# Patient Record
Sex: MALE | Race: WHITE | URBAN - METROPOLITAN AREA
[De-identification: names, ages, dates, MRNs, and addresses within clinical notes are randomized per-mention and may not be internally consistent; named-entity substitution may affect disease eponyms.]

---

## 2017-02-12 ENCOUNTER — EMERGENCY (EMERGENCY)
Facility: HOSPITAL | Age: 62
LOS: 0 days | Discharge: HOME | End: 2017-02-12

## 2017-06-27 DIAGNOSIS — Z87.891 PERSONAL HISTORY OF NICOTINE DEPENDENCE: ICD-10-CM

## 2017-06-27 DIAGNOSIS — R06.2 WHEEZING: ICD-10-CM

## 2017-06-27 DIAGNOSIS — I48.91 UNSPECIFIED ATRIAL FIBRILLATION: ICD-10-CM

## 2017-06-27 DIAGNOSIS — R42 DIZZINESS AND GIDDINESS: ICD-10-CM

## 2019-04-20 ENCOUNTER — EMERGENCY (EMERGENCY)
Facility: HOSPITAL | Age: 64
LOS: 0 days | Discharge: HOME | End: 2019-04-20
Attending: EMERGENCY MEDICINE | Admitting: EMERGENCY MEDICINE
Payer: MEDICAID

## 2019-04-20 VITALS
WEIGHT: 250 LBS | HEART RATE: 63 BPM | TEMPERATURE: 98 F | DIASTOLIC BLOOD PRESSURE: 87 MMHG | RESPIRATION RATE: 16 BRPM | OXYGEN SATURATION: 97 % | HEIGHT: 74 IN | SYSTOLIC BLOOD PRESSURE: 139 MMHG

## 2019-04-20 VITALS
TEMPERATURE: 98 F | SYSTOLIC BLOOD PRESSURE: 140 MMHG | HEART RATE: 94 BPM | OXYGEN SATURATION: 98 % | RESPIRATION RATE: 18 BRPM | DIASTOLIC BLOOD PRESSURE: 88 MMHG

## 2019-04-20 DIAGNOSIS — F17.210 NICOTINE DEPENDENCE, CIGARETTES, UNCOMPLICATED: ICD-10-CM

## 2019-04-20 DIAGNOSIS — Z79.899 OTHER LONG TERM (CURRENT) DRUG THERAPY: ICD-10-CM

## 2019-04-20 DIAGNOSIS — G40.909 EPILEPSY, UNSPECIFIED, NOT INTRACTABLE, WITHOUT STATUS EPILEPTICUS: ICD-10-CM

## 2019-04-20 DIAGNOSIS — Z79.01 LONG TERM (CURRENT) USE OF ANTICOAGULANTS: ICD-10-CM

## 2019-04-20 DIAGNOSIS — S42.009A FRACTURE OF UNSPECIFIED PART OF UNSPECIFIED CLAVICLE, INITIAL ENCOUNTER FOR CLOSED FRACTURE: Chronic | ICD-10-CM

## 2019-04-20 DIAGNOSIS — I48.91 UNSPECIFIED ATRIAL FIBRILLATION: ICD-10-CM

## 2019-04-20 DIAGNOSIS — Z98.890 OTHER SPECIFIED POSTPROCEDURAL STATES: ICD-10-CM

## 2019-04-20 LAB
ALBUMIN SERPL ELPH-MCNC: 4 G/DL — SIGNIFICANT CHANGE UP (ref 3.5–5.2)
ALP SERPL-CCNC: 102 U/L — SIGNIFICANT CHANGE UP (ref 30–115)
ALT FLD-CCNC: 185 U/L — HIGH (ref 0–41)
ANION GAP SERPL CALC-SCNC: 9 MMOL/L — SIGNIFICANT CHANGE UP (ref 7–14)
APPEARANCE UR: CLEAR — SIGNIFICANT CHANGE UP
AST SERPL-CCNC: 186 U/L — HIGH (ref 0–41)
BILIRUB SERPL-MCNC: 0.9 MG/DL — SIGNIFICANT CHANGE UP (ref 0.2–1.2)
BILIRUB UR-MCNC: NEGATIVE — SIGNIFICANT CHANGE UP
BUN SERPL-MCNC: 8 MG/DL — LOW (ref 10–20)
CALCIUM SERPL-MCNC: 9.6 MG/DL — SIGNIFICANT CHANGE UP (ref 8.5–10.1)
CHLORIDE SERPL-SCNC: 103 MMOL/L — SIGNIFICANT CHANGE UP (ref 98–110)
CO2 SERPL-SCNC: 26 MMOL/L — SIGNIFICANT CHANGE UP (ref 17–32)
COLOR SPEC: YELLOW — SIGNIFICANT CHANGE UP
CREAT SERPL-MCNC: 0.8 MG/DL — SIGNIFICANT CHANGE UP (ref 0.7–1.5)
DIFF PNL FLD: NEGATIVE — SIGNIFICANT CHANGE UP
GAS PNL BLDV: SIGNIFICANT CHANGE UP
GLUCOSE SERPL-MCNC: 101 MG/DL — HIGH (ref 70–99)
GLUCOSE UR QL: NEGATIVE MG/DL — SIGNIFICANT CHANGE UP
HCT VFR BLD CALC: 42.8 % — SIGNIFICANT CHANGE UP (ref 42–52)
HGB BLD-MCNC: 14.8 G/DL — SIGNIFICANT CHANGE UP (ref 14–18)
KETONES UR-MCNC: NEGATIVE — SIGNIFICANT CHANGE UP
LEUKOCYTE ESTERASE UR-ACNC: NEGATIVE — SIGNIFICANT CHANGE UP
MCHC RBC-ENTMCNC: 33.1 PG — HIGH (ref 27–31)
MCHC RBC-ENTMCNC: 34.6 G/DL — SIGNIFICANT CHANGE UP (ref 32–37)
MCV RBC AUTO: 95.7 FL — HIGH (ref 80–94)
NITRITE UR-MCNC: NEGATIVE — SIGNIFICANT CHANGE UP
NRBC # BLD: 0 /100 WBCS — SIGNIFICANT CHANGE UP (ref 0–0)
NT-PROBNP SERPL-SCNC: 137 PG/ML — SIGNIFICANT CHANGE UP (ref 0–300)
PH UR: 6.5 — SIGNIFICANT CHANGE UP (ref 5–8)
PLATELET # BLD AUTO: 134 K/UL — SIGNIFICANT CHANGE UP (ref 130–400)
POTASSIUM SERPL-MCNC: 4.3 MMOL/L — SIGNIFICANT CHANGE UP (ref 3.5–5)
POTASSIUM SERPL-SCNC: 4.3 MMOL/L — SIGNIFICANT CHANGE UP (ref 3.5–5)
PROT SERPL-MCNC: 7.6 G/DL — SIGNIFICANT CHANGE UP (ref 6–8)
PROT UR-MCNC: 100 MG/DL
RBC # BLD: 4.47 M/UL — LOW (ref 4.7–6.1)
RBC # FLD: 13.1 % — SIGNIFICANT CHANGE UP (ref 11.5–14.5)
SODIUM SERPL-SCNC: 138 MMOL/L — SIGNIFICANT CHANGE UP (ref 135–146)
SP GR SPEC: 1.02 — SIGNIFICANT CHANGE UP (ref 1.01–1.03)
TROPONIN T SERPL-MCNC: <0.01 NG/ML — SIGNIFICANT CHANGE UP
UROBILINOGEN FLD QL: 0.2 MG/DL — SIGNIFICANT CHANGE UP (ref 0.2–0.2)
WBC # BLD: 5.86 K/UL — SIGNIFICANT CHANGE UP (ref 4.8–10.8)
WBC # FLD AUTO: 5.86 K/UL — SIGNIFICANT CHANGE UP (ref 4.8–10.8)

## 2019-04-20 PROCEDURE — 70450 CT HEAD/BRAIN W/O DYE: CPT | Mod: 26

## 2019-04-20 PROCEDURE — 71046 X-RAY EXAM CHEST 2 VIEWS: CPT | Mod: 26

## 2019-04-20 PROCEDURE — 99285 EMERGENCY DEPT VISIT HI MDM: CPT

## 2019-04-20 RX ORDER — ASPIRIN/CALCIUM CARB/MAGNESIUM 324 MG
81 TABLET ORAL ONCE
Qty: 0 | Refills: 0 | Status: COMPLETED | OUTPATIENT
Start: 2019-04-20 | End: 2019-04-20

## 2019-04-20 RX ORDER — SODIUM CHLORIDE 9 MG/ML
1000 INJECTION, SOLUTION INTRAVENOUS ONCE
Qty: 0 | Refills: 0 | Status: COMPLETED | OUTPATIENT
Start: 2019-04-20 | End: 2019-04-20

## 2019-04-20 RX ADMIN — Medication 81 MILLIGRAM(S): at 12:28

## 2019-04-20 RX ADMIN — SODIUM CHLORIDE 1000 MILLILITER(S): 9 INJECTION, SOLUTION INTRAVENOUS at 12:29

## 2019-04-20 NOTE — ED ADULT NURSE NOTE - NSIMPLEMENTINTERV_GEN_ALL_ED
Implemented All Universal Safety Interventions:  [place] to call system. Call bell, personal items and telephone within reach. Instruct patient to call for assistance. Room bathroom lighting operational. Non-slip footwear when patient is off stretcher. Physically safe environment: no spills, clutter or unnecessary equipment. Stretcher in lowest position, wheels locked, appropriate side rails in place.

## 2019-04-20 NOTE — ED PROVIDER NOTE - PROGRESS NOTE DETAILS
pt will to have a outpatient neurology workup. pt informed of the importance of follow up with the neurologist and his cardiologist.  Patient to be discharged from ED. Any available test results were discussed with patient and/or family. Verbal instructions given, including instructions to return to ED immediately for any new, worsening, or concerning symptoms. Patient endorsed understanding. Written discharge instructions additionally given, including follow-up plan.

## 2019-04-20 NOTE — ED PROVIDER NOTE - CARE PROVIDER_API CALL
Elbert Ceron)  Neurology; Neuromuscular Medicine  [address] [place] [address], [address]  [place], [place] [id number]  Phone: [phone]  Fax: [phone]  Follow Up Time:

## 2019-04-20 NOTE — ED PROVIDER NOTE - OBJECTIVE STATEMENT
64 yo male with a pmh of afib on Anitha presents after a seizure. he was putting up his storm door when he became dizzy and called on his wife who was able to assist him to the floor while he began seizing. wife witnessed tonic clonic movement along with foaming from the mouth and lost control of him bowels. seizure lasted about 3 minutes and postictal period occurred. he did not hit his head or obtain any trauma from the fall. he had experience seizures in the past with the most recent being 3 months ago but he had not seen a neurologist in the past. he denies any recent illnesses or travel, fevers, chill, abdominal pain, chest pain or difficulty breathing. he smokes about 10 cigarettes a day and drinks about 2 to 3 beers daily. he was seen by his Cardiologist in March when an echo was performed and no changes or abnormalities were reported from his understanding.     PCP- Maryan  Cardio- Paulie

## 2019-04-20 NOTE — ED PROVIDER NOTE - ATTENDING CONTRIBUTION TO CARE
Hx of afib on Eliquis. Hx of seizures since childhood. Many years ago was on dilantin. Not on meds for over 2 years. Rarely has seizures. Had seizure this am. Pt states he does not want to take med. Today was incontinent of stool. Witnessed seizure. On exam S1S2 irreg irreg. lung clear, neuro intact. No signs of injury.

## 2019-04-20 NOTE — ED PROVIDER NOTE - CLINICAL SUMMARY MEDICAL DECISION MAKING FREE TEXT BOX
Pt advised he needs evaluation with neuro for EEG. Would benefit from meds. Pt does not want to consider at this time.

## 2019-04-20 NOTE — ED ADULT TRIAGE NOTE - CHIEF COMPLAINT QUOTE
Pt brought in by ambulance. "I had a seizure." Pt has history of seizures but is not on medication. "In the last year I have had three seizures."

## 2019-04-22 PROBLEM — I48.91 UNSPECIFIED ATRIAL FIBRILLATION: Chronic | Status: ACTIVE | Noted: 2019-04-20

## 2019-04-22 PROBLEM — Z00.00 ENCOUNTER FOR PREVENTIVE HEALTH EXAMINATION: Status: ACTIVE | Noted: 2019-04-22

## 2019-04-22 PROBLEM — R56.9 UNSPECIFIED CONVULSIONS: Chronic | Status: ACTIVE | Noted: 2019-04-20

## 2019-04-30 ENCOUNTER — APPOINTMENT (OUTPATIENT)
Dept: NEUROLOGY | Facility: CLINIC | Age: 64
End: 2019-04-30

## 2019-05-06 ENCOUNTER — OUTPATIENT (OUTPATIENT)
Dept: OUTPATIENT SERVICES | Facility: HOSPITAL | Age: 64
LOS: 1 days | Discharge: HOME | End: 2019-05-06

## 2019-05-06 ENCOUNTER — APPOINTMENT (OUTPATIENT)
Dept: INTERNAL MEDICINE | Facility: CLINIC | Age: 64
End: 2019-05-06

## 2019-05-06 VITALS
HEART RATE: 116 BPM | SYSTOLIC BLOOD PRESSURE: 178 MMHG | WEIGHT: 248 LBS | TEMPERATURE: 97 F | DIASTOLIC BLOOD PRESSURE: 141 MMHG

## 2019-05-06 VITALS — HEIGHT: 74 IN | BODY MASS INDEX: 31.84 KG/M2

## 2019-05-06 DIAGNOSIS — F17.200 NICOTINE DEPENDENCE, UNSPECIFIED, UNCOMPLICATED: ICD-10-CM

## 2019-05-06 DIAGNOSIS — R56.9 UNSPECIFIED CONVULSIONS: ICD-10-CM

## 2019-05-06 DIAGNOSIS — I48.92 UNSPECIFIED ATRIAL FLUTTER: ICD-10-CM

## 2019-05-06 DIAGNOSIS — Z78.9 OTHER SPECIFIED HEALTH STATUS: ICD-10-CM

## 2019-05-06 DIAGNOSIS — S42.009A FRACTURE OF UNSPECIFIED PART OF UNSPECIFIED CLAVICLE, INITIAL ENCOUNTER FOR CLOSED FRACTURE: Chronic | ICD-10-CM

## 2019-05-06 RX ORDER — ASPIRIN 81 MG
81 TABLET, DELAYED RELEASE (ENTERIC COATED) ORAL
Refills: 0 | Status: ACTIVE | COMMUNITY

## 2019-05-06 RX ORDER — APIXABAN 5 MG/1
5 TABLET, FILM COATED ORAL
Refills: 0 | Status: ACTIVE | COMMUNITY

## 2019-05-06 RX ORDER — METOPROLOL SUCCINATE 50 MG/1
50 TABLET, EXTENDED RELEASE ORAL
Refills: 0 | Status: ACTIVE | COMMUNITY

## 2019-05-06 NOTE — HISTORY OF PRESENT ILLNESS
[de-identified] : 64M with Hx of AFlutter on eliquis presents for comprehensive visit. Patient was in ED 2 weeks ago for tonic clonic seizure. He has previous episode 9 months ago as well. They were similar with preceding dizziness, and LOC briefly. seizing. Wife witnessed tonic clonic movement along with foaming from the mouth and lost control of him bowels. seizure lasted about 3 minutes and postictal period. States he has history of seizures when he was 19-20. He was taking dilantin but self continued 40 years ago. Otherwise denies any fevers, chills, cough, chest pain, sob, nausea, vomiting, diarrhea, dysuria.

## 2019-05-06 NOTE — ASSESSMENT
[FreeTextEntry1] : 64M with Hx of AFlutter on eliquis presents for comprehensive visit and recent seizure episode\par \par #Tonic clonic seizure\par - Neurology evaluation\par - check EEG, patient had recent echo w cardiology wnl\par - Blood work in ED unremarkable \par \par #Aflutter \par - c/w eliquis and metoprolol\par - He is following dr. barnard next appt in june \par \par HCM\par - colonoscopy referral  \par - will check lipid, a1c, tsh, recent bmp and cbc wnl \par rto in 3 months

## 2019-05-06 NOTE — PHYSICAL EXAM
[No Acute Distress] : no acute distress [Well Nourished] : well nourished [No Respiratory Distress] : no respiratory distress  [Well Developed] : well developed [Clear to Auscultation] : lungs were clear to auscultation bilaterally [Normal Rate] : normal rate  [Regular Rhythm] : with a regular rhythm [Normal S1, S2] : normal S1 and S2 [Soft] : abdomen soft [Non Tender] : non-tender [Non-distended] : non-distended

## 2019-05-08 DIAGNOSIS — R56.9 UNSPECIFIED CONVULSIONS: ICD-10-CM

## 2019-05-08 DIAGNOSIS — I48.3 TYPICAL ATRIAL FLUTTER: ICD-10-CM

## 2019-05-08 DIAGNOSIS — I10 ESSENTIAL (PRIMARY) HYPERTENSION: ICD-10-CM

## 2019-06-18 ENCOUNTER — APPOINTMENT (OUTPATIENT)
Dept: INTERNAL MEDICINE | Facility: CLINIC | Age: 64
End: 2019-06-18

## 2019-08-05 PROBLEM — R56.9 SEIZURES: Status: ACTIVE | Noted: 2019-05-06

## 2019-08-06 ENCOUNTER — APPOINTMENT (OUTPATIENT)
Dept: NEUROLOGY | Facility: CLINIC | Age: 64
End: 2019-08-06

## 2019-08-09 ENCOUNTER — APPOINTMENT (OUTPATIENT)
Dept: GASTROENTEROLOGY | Facility: CLINIC | Age: 64
End: 2019-08-09

## 2019-08-19 ENCOUNTER — APPOINTMENT (OUTPATIENT)
Dept: INTERNAL MEDICINE | Facility: CLINIC | Age: 64
End: 2019-08-19

## 2019-08-28 ENCOUNTER — RX RENEWAL (OUTPATIENT)
Age: 64
End: 2019-08-28

## 2019-08-28 RX ORDER — AMLODIPINE BESYLATE 5 MG/1
5 TABLET ORAL DAILY
Qty: 30 | Refills: 3 | Status: ACTIVE | COMMUNITY
Start: 2019-05-06 | End: 1900-01-01

## 2020-10-29 NOTE — ED ADULT TRIAGE NOTE - NS ED NURSE DIRECT TO ROOM YN
User: Rufus Saint Date/time: 10/22/20 10:12 AM   Comment: Tonio Su   Context: Irina Recall Report Outcome: Left Message   Phone number: [phone] Phone Type: Mobile   Comm. type: Telephone Call type: Outgoing   Contact: Trena Arciniega Relation to patient: Self      User: Rufus Saint Date/time: 10/22/20 10:12 AM   Comment: Tonio Su   Context: Irina Recall Report Outcome: Letter sent   Phone number: [phone] Phone Type: Mobile   Comm. type: Telephone Call type: Outgoing   Contact: Trena Arciniega Relation to patient: Self      User: Zachery Blair Date/time: 10/21/20 11:21 AM   Comment: Â    Context: Irina Recall Report Outcome: Left Message   Phone number: [phone] Phone Type: Â    Comm.  type: Telephone Call type: Outgoing   Contact: Trena Arciniega Relation to patient: Self Yes

## 2024-02-16 ENCOUNTER — EMERGENCY (EMERGENCY)
Facility: HOSPITAL | Age: 69
LOS: 0 days | Discharge: HOME | End: 2024-02-16
Attending: EMERGENCY MEDICINE
Payer: MEDICARE

## 2024-02-16 VITALS
DIASTOLIC BLOOD PRESSURE: 104 MMHG | SYSTOLIC BLOOD PRESSURE: 183 MMHG | OXYGEN SATURATION: 98 % | WEIGHT: 220.02 LBS | HEIGHT: 74 IN | TEMPERATURE: 98 F | RESPIRATION RATE: 20 BRPM | HEART RATE: 86 BPM

## 2024-02-16 VITALS — DIASTOLIC BLOOD PRESSURE: 113 MMHG | SYSTOLIC BLOOD PRESSURE: 154 MMHG | HEART RATE: 68 BPM

## 2024-02-16 DIAGNOSIS — R74.8 ABNORMAL LEVELS OF OTHER SERUM ENZYMES: ICD-10-CM

## 2024-02-16 DIAGNOSIS — K83.8 OTHER SPECIFIED DISEASES OF BILIARY TRACT: ICD-10-CM

## 2024-02-16 DIAGNOSIS — R09.81 NASAL CONGESTION: ICD-10-CM

## 2024-02-16 DIAGNOSIS — F17.200 NICOTINE DEPENDENCE, UNSPECIFIED, UNCOMPLICATED: ICD-10-CM

## 2024-02-16 DIAGNOSIS — R53.83 OTHER FATIGUE: ICD-10-CM

## 2024-02-16 DIAGNOSIS — R53.1 WEAKNESS: ICD-10-CM

## 2024-02-16 DIAGNOSIS — Z20.822 CONTACT WITH AND (SUSPECTED) EXPOSURE TO COVID-19: ICD-10-CM

## 2024-02-16 DIAGNOSIS — R19.7 DIARRHEA, UNSPECIFIED: ICD-10-CM

## 2024-02-16 DIAGNOSIS — I10 ESSENTIAL (PRIMARY) HYPERTENSION: ICD-10-CM

## 2024-02-16 DIAGNOSIS — Z79.01 LONG TERM (CURRENT) USE OF ANTICOAGULANTS: ICD-10-CM

## 2024-02-16 DIAGNOSIS — I48.91 UNSPECIFIED ATRIAL FIBRILLATION: ICD-10-CM

## 2024-02-16 DIAGNOSIS — G40.909 EPILEPSY, UNSPECIFIED, NOT INTRACTABLE, WITHOUT STATUS EPILEPTICUS: ICD-10-CM

## 2024-02-16 DIAGNOSIS — S42.009A FRACTURE OF UNSPECIFIED PART OF UNSPECIFIED CLAVICLE, INITIAL ENCOUNTER FOR CLOSED FRACTURE: Chronic | ICD-10-CM

## 2024-02-16 LAB
ALBUMIN SERPL ELPH-MCNC: 3.8 G/DL — SIGNIFICANT CHANGE UP (ref 3.5–5.2)
ALP SERPL-CCNC: 176 U/L — HIGH (ref 30–115)
ALT FLD-CCNC: 128 U/L — HIGH (ref 0–41)
ANION GAP SERPL CALC-SCNC: 14 MMOL/L — SIGNIFICANT CHANGE UP (ref 7–14)
AST SERPL-CCNC: 161 U/L — HIGH (ref 0–41)
BASOPHILS # BLD AUTO: 0.07 K/UL — SIGNIFICANT CHANGE UP (ref 0–0.2)
BASOPHILS NFR BLD AUTO: 0.7 % — SIGNIFICANT CHANGE UP (ref 0–1)
BILIRUB SERPL-MCNC: 2.3 MG/DL — HIGH (ref 0.2–1.2)
BUN SERPL-MCNC: 10 MG/DL — SIGNIFICANT CHANGE UP (ref 10–20)
CALCIUM SERPL-MCNC: 9.1 MG/DL — SIGNIFICANT CHANGE UP (ref 8.4–10.5)
CHLORIDE SERPL-SCNC: 102 MMOL/L — SIGNIFICANT CHANGE UP (ref 98–110)
CO2 SERPL-SCNC: 22 MMOL/L — SIGNIFICANT CHANGE UP (ref 17–32)
CREAT SERPL-MCNC: 0.8 MG/DL — SIGNIFICANT CHANGE UP (ref 0.7–1.5)
EGFR: 98 ML/MIN/1.73M2 — SIGNIFICANT CHANGE UP
EOSINOPHIL # BLD AUTO: 0.18 K/UL — SIGNIFICANT CHANGE UP (ref 0–0.7)
EOSINOPHIL NFR BLD AUTO: 1.8 % — SIGNIFICANT CHANGE UP (ref 0–8)
FLUAV AG NPH QL: SIGNIFICANT CHANGE UP
FLUBV AG NPH QL: SIGNIFICANT CHANGE UP
GLUCOSE SERPL-MCNC: 117 MG/DL — HIGH (ref 70–99)
HCT VFR BLD CALC: 47.9 % — SIGNIFICANT CHANGE UP (ref 42–52)
HGB BLD-MCNC: 16.9 G/DL — SIGNIFICANT CHANGE UP (ref 14–18)
IMM GRANULOCYTES NFR BLD AUTO: 0.2 % — SIGNIFICANT CHANGE UP (ref 0.1–0.3)
LYMPHOCYTES # BLD AUTO: 1.36 K/UL — SIGNIFICANT CHANGE UP (ref 1.2–3.4)
LYMPHOCYTES # BLD AUTO: 13.7 % — LOW (ref 20.5–51.1)
MCHC RBC-ENTMCNC: 33.1 PG — HIGH (ref 27–31)
MCHC RBC-ENTMCNC: 35.3 G/DL — SIGNIFICANT CHANGE UP (ref 32–37)
MCV RBC AUTO: 93.7 FL — SIGNIFICANT CHANGE UP (ref 80–94)
MONOCYTES # BLD AUTO: 0.77 K/UL — HIGH (ref 0.1–0.6)
MONOCYTES NFR BLD AUTO: 7.7 % — SIGNIFICANT CHANGE UP (ref 1.7–9.3)
NEUTROPHILS # BLD AUTO: 7.55 K/UL — HIGH (ref 1.4–6.5)
NEUTROPHILS NFR BLD AUTO: 75.9 % — HIGH (ref 42.2–75.2)
NRBC # BLD: 0 /100 WBCS — SIGNIFICANT CHANGE UP (ref 0–0)
PLATELET # BLD AUTO: 120 K/UL — LOW (ref 130–400)
PMV BLD: 12.7 FL — HIGH (ref 7.4–10.4)
POTASSIUM SERPL-MCNC: 4.2 MMOL/L — SIGNIFICANT CHANGE UP (ref 3.5–5)
POTASSIUM SERPL-SCNC: 4.2 MMOL/L — SIGNIFICANT CHANGE UP (ref 3.5–5)
PROT SERPL-MCNC: 7.8 G/DL — SIGNIFICANT CHANGE UP (ref 6–8)
RBC # BLD: 5.11 M/UL — SIGNIFICANT CHANGE UP (ref 4.7–6.1)
RBC # FLD: 13.4 % — SIGNIFICANT CHANGE UP (ref 11.5–14.5)
RSV RNA NPH QL NAA+NON-PROBE: SIGNIFICANT CHANGE UP
SARS-COV-2 RNA SPEC QL NAA+PROBE: SIGNIFICANT CHANGE UP
SODIUM SERPL-SCNC: 138 MMOL/L — SIGNIFICANT CHANGE UP (ref 135–146)
WBC # BLD: 9.95 K/UL — SIGNIFICANT CHANGE UP (ref 4.8–10.8)
WBC # FLD AUTO: 9.95 K/UL — SIGNIFICANT CHANGE UP (ref 4.8–10.8)

## 2024-02-16 PROCEDURE — 93010 ELECTROCARDIOGRAM REPORT: CPT

## 2024-02-16 PROCEDURE — 76705 ECHO EXAM OF ABDOMEN: CPT

## 2024-02-16 PROCEDURE — 71045 X-RAY EXAM CHEST 1 VIEW: CPT | Mod: 26

## 2024-02-16 PROCEDURE — 99285 EMERGENCY DEPT VISIT HI MDM: CPT | Mod: 25

## 2024-02-16 PROCEDURE — 99285 EMERGENCY DEPT VISIT HI MDM: CPT | Mod: FS

## 2024-02-16 PROCEDURE — 0241U: CPT

## 2024-02-16 PROCEDURE — 93005 ELECTROCARDIOGRAM TRACING: CPT

## 2024-02-16 PROCEDURE — 80053 COMPREHEN METABOLIC PANEL: CPT

## 2024-02-16 PROCEDURE — 36415 COLL VENOUS BLD VENIPUNCTURE: CPT

## 2024-02-16 PROCEDURE — 76705 ECHO EXAM OF ABDOMEN: CPT | Mod: 26

## 2024-02-16 PROCEDURE — 71045 X-RAY EXAM CHEST 1 VIEW: CPT

## 2024-02-16 PROCEDURE — 85025 COMPLETE CBC W/AUTO DIFF WBC: CPT

## 2024-02-16 RX ORDER — METOPROLOL TARTRATE 50 MG
0 TABLET ORAL
Qty: 0 | Refills: 0 | DISCHARGE

## 2024-02-16 RX ORDER — APIXABAN 2.5 MG/1
1 TABLET, FILM COATED ORAL
Qty: 0 | Refills: 0 | DISCHARGE

## 2024-02-16 RX ORDER — SODIUM CHLORIDE 9 MG/ML
1000 INJECTION INTRAMUSCULAR; INTRAVENOUS; SUBCUTANEOUS ONCE
Refills: 0 | Status: COMPLETED | OUTPATIENT
Start: 2024-02-16 | End: 2024-02-16

## 2024-02-16 RX ORDER — AMLODIPINE BESYLATE 2.5 MG/1
1 TABLET ORAL
Qty: 30 | Refills: 0
Start: 2024-02-16

## 2024-02-16 RX ORDER — AMLODIPINE BESYLATE 2.5 MG/1
1 TABLET ORAL
Refills: 0 | DISCHARGE

## 2024-02-16 RX ORDER — AMLODIPINE BESYLATE 2.5 MG/1
5 TABLET ORAL ONCE
Refills: 0 | Status: COMPLETED | OUTPATIENT
Start: 2024-02-16 | End: 2024-02-16

## 2024-02-16 RX ADMIN — SODIUM CHLORIDE 1000 MILLILITER(S): 9 INJECTION INTRAMUSCULAR; INTRAVENOUS; SUBCUTANEOUS at 09:17

## 2024-02-16 RX ADMIN — AMLODIPINE BESYLATE 5 MILLIGRAM(S): 2.5 TABLET ORAL at 09:16

## 2024-02-16 NOTE — ED PROVIDER NOTE - CLINICAL SUMMARY MEDICAL DECISION MAKING FREE TEXT BOX
In my opinion, outpatient treatment and follow up are appropriate.  See attending note for documentation of medical decision making.  [facility] medication renewal of his antihypertensive.  Request fulfilled.

## 2024-02-16 NOTE — ED PROVIDER NOTE - CARE PROVIDER_API CALL
Ansley Lara  Gastroenterology  [address] [place] [address]  Phone: [phone]  Fax: [phone]  Follow Up Time:

## 2024-02-16 NOTE — ED PROVIDER NOTE - PATIENT PORTAL LINK FT
You can access the FollowMyHealth Patient Portal offered by [facility] by registering at the following website: [email or web address]. By joining [facility]’s FollowMyHealth portal, you will also be able to view your health information using other applications (apps) compatible with our system.

## 2024-02-16 NOTE — ED PROVIDER NOTE - PHYSICAL EXAMINATION
CONST: Well appearing in NAD  EYES: Sclera and conjunctiva clear.   ENT: Clear nasal discharge. Oropharynx normal appearing, no erythema or exudates. No abscess or swelling. Uvula midline.   NECK: Non-tender, no meningeal signs. normal ROM. supple   CARD: S1 S2; No jvd  RESP: Equal BS B/L, No wheezes, rhonchi or rales. No distress  GI: Soft, non-tender, non-distended. no cva tenderness. normal BS  MS: Normal ROM in all extremities. pulses 2 +. no calf tenderness or swelling  SKIN: Warm, dry, no acute rashes. Good turgor  NEURO: A&Ox3, No focal deficits. Strength 5/5 with no sensory deficits.

## 2024-02-16 NOTE — ED PROVIDER NOTE - CARE PROVIDERS DIRECT ADDRESSES
[email or web address]

## 2024-02-16 NOTE — ED PROVIDER NOTE - WHICH SHOWED
Chest x-ray shows no infiltrate, no pneumothorax Chest x-ray shows no infiltrate, no pneumothorax  Atrial fibrillation.  Rate 104.  Left axis deviation.  Nonspecific ST changes. Chest x-ray shows no infiltrate, no pneumothorax  Atrial fibrillation.  Rate 104.  Left axis deviation.  Nonspecific ST changes.  Sono shows dilated CBD

## 2024-02-16 NOTE — ED PROVIDER NOTE - ATTENDING APP SHARED VISIT CONTRIBUTION OF CARE
Patient with flulike symptoms.  He denies chest pain, shortness of breath.  He had not denies abdominal pain.  Positive diarrhea.  Vital signs noted.  Note blood pressure may be associate with noncompliance to antihypertensives.  Neck supple.  Chest clear.  Heart irregular rate no murmur.  Abdomen nontender.  No CVA tenderness.  Skin color is normal.  Extremity equal pulses.  Diagnostic testing reviewed.  WBC is within normal limits.  This makes a serious bacterial infection less likely.  Patient has abnormalities of the liver function test.  He admits to daily drinking.  Chest x-ray shows no pneumonia.  Sonogram shows a dilated CBD.  Patient has no symptoms currently that are consistent with acute biliary disease.  In my opinion, outpatient follow-up and treatment are medically appropriate.  Strict return precautions discussed with patient.  Copies of all diagnostic testing provided to patient aid in follow-up.

## 2024-02-16 NOTE — ED PROVIDER NOTE - OBJECTIVE STATEMENT
68-year-old male past medical history of seizure disorder, A-fib on Eliquis, hypertension presents for generalized weakness.  Patient endorses generalized fatigue x 1 week with associated nasal congestion and loose stools.  No aggravating relieving factors.  Patient endorses drinking beer daily.  Denies fever, headache, chest pain, shortness of breath, abdominal pain, dysuria, hematuria

## 2024-10-22 ENCOUNTER — APPOINTMENT (OUTPATIENT)
Dept: ORTHOPEDIC SURGERY | Facility: CLINIC | Age: 69
End: 2024-10-22
Payer: MEDICARE

## 2024-10-22 DIAGNOSIS — M17.0 BILATERAL PRIMARY OSTEOARTHRITIS OF KNEE: ICD-10-CM

## 2024-10-22 PROCEDURE — 99203 OFFICE O/P NEW LOW 30 MIN: CPT

## 2024-10-22 PROCEDURE — 73562 X-RAY EXAM OF KNEE 3: CPT | Mod: 50

## 2024-11-22 ENCOUNTER — APPOINTMENT (OUTPATIENT)
Dept: ORTHOPEDIC SURGERY | Facility: CLINIC | Age: 69
End: 2024-11-22
Payer: MEDICARE

## 2024-11-22 DIAGNOSIS — M17.0 BILATERAL PRIMARY OSTEOARTHRITIS OF KNEE: ICD-10-CM

## 2024-11-22 PROCEDURE — 99213 OFFICE O/P EST LOW 20 MIN: CPT | Mod: 25

## 2024-11-22 PROCEDURE — 20610 DRAIN/INJ JOINT/BURSA W/O US: CPT | Mod: 50

## 2024-12-25 PROBLEM — F10.90 ALCOHOL USE: Status: ACTIVE | Noted: 2019-05-06

## 2025-03-18 ENCOUNTER — INPATIENT (INPATIENT)
Facility: HOSPITAL | Age: 70
LOS: 4 days | Discharge: HOME | DRG: 379 | End: 2025-03-23
Attending: INTERNAL MEDICINE | Admitting: FAMILY MEDICINE
Payer: MEDICARE

## 2025-03-18 VITALS
TEMPERATURE: 99 F | SYSTOLIC BLOOD PRESSURE: 116 MMHG | RESPIRATION RATE: 18 BRPM | OXYGEN SATURATION: 100 % | WEIGHT: 220.02 LBS | HEART RATE: 125 BPM | DIASTOLIC BLOOD PRESSURE: 82 MMHG

## 2025-03-18 DIAGNOSIS — S42.009A FRACTURE OF UNSPECIFIED PART OF UNSPECIFIED CLAVICLE, INITIAL ENCOUNTER FOR CLOSED FRACTURE: Chronic | ICD-10-CM

## 2025-03-18 LAB
ALBUMIN SERPL ELPH-MCNC: 2.8 G/DL — LOW (ref 3.5–5.2)
ALP SERPL-CCNC: 129 U/L — HIGH (ref 30–115)
ALT FLD-CCNC: 58 U/L — HIGH (ref 0–41)
ANION GAP SERPL CALC-SCNC: 8 MMOL/L — SIGNIFICANT CHANGE UP (ref 7–14)
ANISOCYTOSIS BLD QL: SLIGHT — SIGNIFICANT CHANGE UP
APTT BLD: 27.7 SEC — SIGNIFICANT CHANGE UP (ref 27–39.2)
AST SERPL-CCNC: 90 U/L — HIGH (ref 0–41)
BASOPHILS # BLD AUTO: 0.03 K/UL — SIGNIFICANT CHANGE UP (ref 0–0.2)
BASOPHILS NFR BLD AUTO: 0.3 % — SIGNIFICANT CHANGE UP (ref 0–1)
BILIRUB SERPL-MCNC: 0.8 MG/DL — SIGNIFICANT CHANGE UP (ref 0.2–1.2)
BUN SERPL-MCNC: 26 MG/DL — HIGH (ref 10–20)
CALCIUM SERPL-MCNC: 8 MG/DL — LOW (ref 8.4–10.5)
CHLORIDE SERPL-SCNC: 108 MMOL/L — SIGNIFICANT CHANGE UP (ref 98–110)
CO2 SERPL-SCNC: 22 MMOL/L — SIGNIFICANT CHANGE UP (ref 17–32)
CREAT SERPL-MCNC: 0.7 MG/DL — SIGNIFICANT CHANGE UP (ref 0.7–1.5)
EGFR: 101 ML/MIN/1.73M2 — SIGNIFICANT CHANGE UP
EGFR: 101 ML/MIN/1.73M2 — SIGNIFICANT CHANGE UP
EOSINOPHIL # BLD AUTO: 0.13 K/UL — SIGNIFICANT CHANGE UP (ref 0–0.7)
EOSINOPHIL NFR BLD AUTO: 1.5 % — SIGNIFICANT CHANGE UP (ref 0–8)
GLUCOSE SERPL-MCNC: 126 MG/DL — HIGH (ref 70–99)
HCT VFR BLD CALC: 19.8 % — LOW (ref 42–52)
HGB BLD-MCNC: 6.3 G/DL — CRITICAL LOW (ref 14–18)
HYPOCHROMIA BLD QL: SLIGHT — SIGNIFICANT CHANGE UP
IMM GRANULOCYTES NFR BLD AUTO: 0.5 % — HIGH (ref 0.1–0.3)
INR BLD: 1.64 RATIO — HIGH (ref 0.65–1.3)
LYMPHOCYTES # BLD AUTO: 1.08 K/UL — LOW (ref 1.2–3.4)
LYMPHOCYTES # BLD AUTO: 12.6 % — LOW (ref 20.5–51.1)
MAGNESIUM SERPL-MCNC: 1.6 MG/DL — LOW (ref 1.8–2.4)
MANUAL SMEAR VERIFICATION: SIGNIFICANT CHANGE UP
MCHC RBC-ENTMCNC: 28.9 PG — SIGNIFICANT CHANGE UP (ref 27–31)
MCHC RBC-ENTMCNC: 31.8 G/DL — LOW (ref 32–37)
MCV RBC AUTO: 90.8 FL — SIGNIFICANT CHANGE UP (ref 80–94)
MONOCYTES # BLD AUTO: 0.76 K/UL — HIGH (ref 0.1–0.6)
MONOCYTES NFR BLD AUTO: 8.8 % — SIGNIFICANT CHANGE UP (ref 1.7–9.3)
NEUTROPHILS # BLD AUTO: 6.55 K/UL — HIGH (ref 1.4–6.5)
NEUTROPHILS NFR BLD AUTO: 76.3 % — HIGH (ref 42.2–75.2)
NRBC BLD AUTO-RTO: 0 /100 WBCS — SIGNIFICANT CHANGE UP (ref 0–0)
PLAT MORPH BLD: NORMAL — SIGNIFICANT CHANGE UP
PLATELET # BLD AUTO: 133 K/UL — SIGNIFICANT CHANGE UP (ref 130–400)
PMV BLD: 13.7 FL — HIGH (ref 7.4–10.4)
POTASSIUM SERPL-MCNC: 4.4 MMOL/L — SIGNIFICANT CHANGE UP (ref 3.5–5)
POTASSIUM SERPL-SCNC: 4.4 MMOL/L — SIGNIFICANT CHANGE UP (ref 3.5–5)
PROT SERPL-MCNC: 5.3 G/DL — LOW (ref 6–8)
PROTHROM AB SERPL-ACNC: 19.6 SEC — HIGH (ref 9.95–12.87)
RBC # BLD: 2.18 M/UL — LOW (ref 4.7–6.1)
RBC # FLD: 16.6 % — HIGH (ref 11.5–14.5)
RBC BLD AUTO: NORMAL — SIGNIFICANT CHANGE UP
SODIUM SERPL-SCNC: 138 MMOL/L — SIGNIFICANT CHANGE UP (ref 135–146)
TROPONIN T, HIGH SENSITIVITY RESULT: 20 NG/L — SIGNIFICANT CHANGE UP (ref 6–21)
WBC # BLD: 8.59 K/UL — SIGNIFICANT CHANGE UP (ref 4.8–10.8)
WBC # FLD AUTO: 8.59 K/UL — SIGNIFICANT CHANGE UP (ref 4.8–10.8)

## 2025-03-18 PROCEDURE — 99291 CRITICAL CARE FIRST HOUR: CPT | Mod: FS

## 2025-03-18 PROCEDURE — 71045 X-RAY EXAM CHEST 1 VIEW: CPT | Mod: 26

## 2025-03-18 PROCEDURE — 71260 CT THORAX DX C+: CPT | Mod: 26

## 2025-03-18 PROCEDURE — 93010 ELECTROCARDIOGRAM REPORT: CPT

## 2025-03-18 PROCEDURE — 74177 CT ABD & PELVIS W/CONTRAST: CPT | Mod: 26

## 2025-03-18 PROCEDURE — 70450 CT HEAD/BRAIN W/O DYE: CPT | Mod: 26

## 2025-03-18 PROCEDURE — 72125 CT NECK SPINE W/O DYE: CPT | Mod: 26

## 2025-03-18 NOTE — ED ADULT TRIAGE NOTE - PATIENT ON (OXYGEN DELIVERY METHOD)
Pt prepped and draped in sterile fashion. 3cm incision made over R temple. Electrocautery and blunt dissection to visualization of temporal artery. 3-0 silk suture x3 used to ligate artery. Biopsy taken and sent for pathology. Hemostasis confirmed. Deep dermals w/ 3-0 vicryl. Skin closed w/ running monocryl and dermabond.
room air

## 2025-03-19 ENCOUNTER — TRANSCRIPTION ENCOUNTER (OUTPATIENT)
Age: 70
End: 2025-03-19

## 2025-03-19 DIAGNOSIS — F10.20 ALCOHOL DEPENDENCE, UNCOMPLICATED: ICD-10-CM

## 2025-03-19 DIAGNOSIS — K92.2 GASTROINTESTINAL HEMORRHAGE, UNSPECIFIED: ICD-10-CM

## 2025-03-19 LAB
ABO RH CONFIRMATION: SIGNIFICANT CHANGE UP
ALBUMIN SERPL ELPH-MCNC: 2.8 G/DL — LOW (ref 3.5–5.2)
ALP SERPL-CCNC: 110 U/L — SIGNIFICANT CHANGE UP (ref 30–115)
ALT FLD-CCNC: 53 U/L — HIGH (ref 0–41)
ANION GAP SERPL CALC-SCNC: 7 MMOL/L — SIGNIFICANT CHANGE UP (ref 7–14)
AST SERPL-CCNC: 88 U/L — HIGH (ref 0–41)
BILIRUB DIRECT SERPL-MCNC: 0.5 MG/DL — HIGH (ref 0–0.3)
BILIRUB INDIRECT FLD-MCNC: 0.9 MG/DL — SIGNIFICANT CHANGE UP (ref 0.2–1.2)
BILIRUB SERPL-MCNC: 1.4 MG/DL — HIGH (ref 0.2–1.2)
BLD GP AB SCN SERPL QL: SIGNIFICANT CHANGE UP
BUN SERPL-MCNC: 24 MG/DL — HIGH (ref 10–20)
CALCIUM SERPL-MCNC: 7.7 MG/DL — LOW (ref 8.4–10.5)
CHLORIDE SERPL-SCNC: 111 MMOL/L — HIGH (ref 98–110)
CO2 SERPL-SCNC: 22 MMOL/L — SIGNIFICANT CHANGE UP (ref 17–32)
CREAT SERPL-MCNC: 0.7 MG/DL — SIGNIFICANT CHANGE UP (ref 0.7–1.5)
EGFR: 101 ML/MIN/1.73M2 — SIGNIFICANT CHANGE UP
EGFR: 101 ML/MIN/1.73M2 — SIGNIFICANT CHANGE UP
GLUCOSE SERPL-MCNC: 117 MG/DL — HIGH (ref 70–99)
HCT VFR BLD CALC: 21.2 % — LOW (ref 42–52)
HCT VFR BLD CALC: 23 % — LOW (ref 42–52)
HCT VFR BLD CALC: 23.5 % — LOW (ref 42–52)
HGB BLD-MCNC: 7.1 G/DL — LOW (ref 14–18)
HGB BLD-MCNC: 7.5 G/DL — LOW (ref 14–18)
HGB BLD-MCNC: 7.7 G/DL — LOW (ref 14–18)
MAGNESIUM SERPL-MCNC: 2.2 MG/DL — SIGNIFICANT CHANGE UP (ref 1.8–2.4)
MCHC RBC-ENTMCNC: 28.2 PG — SIGNIFICANT CHANGE UP (ref 27–31)
MCHC RBC-ENTMCNC: 28.3 PG — SIGNIFICANT CHANGE UP (ref 27–31)
MCHC RBC-ENTMCNC: 29.3 PG — SIGNIFICANT CHANGE UP (ref 27–31)
MCHC RBC-ENTMCNC: 32.6 G/DL — SIGNIFICANT CHANGE UP (ref 32–37)
MCHC RBC-ENTMCNC: 32.8 G/DL — SIGNIFICANT CHANGE UP (ref 32–37)
MCHC RBC-ENTMCNC: 33.5 G/DL — SIGNIFICANT CHANGE UP (ref 32–37)
MCV RBC AUTO: 86.4 FL — SIGNIFICANT CHANGE UP (ref 80–94)
MCV RBC AUTO: 86.5 FL — SIGNIFICANT CHANGE UP (ref 80–94)
MCV RBC AUTO: 87.6 FL — SIGNIFICANT CHANGE UP (ref 80–94)
NRBC BLD AUTO-RTO: 0 /100 WBCS — SIGNIFICANT CHANGE UP (ref 0–0)
PHOSPHATE SERPL-MCNC: 2.7 MG/DL — SIGNIFICANT CHANGE UP (ref 2.1–4.9)
PLATELET # BLD AUTO: 149 K/UL — SIGNIFICANT CHANGE UP (ref 130–400)
PLATELET # BLD AUTO: 154 K/UL — SIGNIFICANT CHANGE UP (ref 130–400)
PLATELET # BLD AUTO: 181 K/UL — SIGNIFICANT CHANGE UP (ref 130–400)
PMV BLD: 12.6 FL — HIGH (ref 7.4–10.4)
PMV BLD: 12.7 FL — HIGH (ref 7.4–10.4)
PMV BLD: 13 FL — HIGH (ref 7.4–10.4)
POTASSIUM SERPL-MCNC: 4.6 MMOL/L — SIGNIFICANT CHANGE UP (ref 3.5–5)
POTASSIUM SERPL-SCNC: 4.6 MMOL/L — SIGNIFICANT CHANGE UP (ref 3.5–5)
PROT SERPL-MCNC: 4.9 G/DL — LOW (ref 6–8)
RBC # BLD: 2.42 M/UL — LOW (ref 4.7–6.1)
RBC # BLD: 2.66 M/UL — LOW (ref 4.7–6.1)
RBC # BLD: 2.72 M/UL — LOW (ref 4.7–6.1)
RBC # FLD: 17.3 % — HIGH (ref 11.5–14.5)
RBC # FLD: 18.1 % — HIGH (ref 11.5–14.5)
RBC # FLD: 18.3 % — HIGH (ref 11.5–14.5)
SODIUM SERPL-SCNC: 140 MMOL/L — SIGNIFICANT CHANGE UP (ref 135–146)
TROPONIN T, HIGH SENSITIVITY RESULT: 23 NG/L — HIGH (ref 6–21)
WBC # BLD: 10.83 K/UL — HIGH (ref 4.8–10.8)
WBC # BLD: 11.09 K/UL — HIGH (ref 4.8–10.8)
WBC # BLD: 9.39 K/UL — SIGNIFICANT CHANGE UP (ref 4.8–10.8)
WBC # FLD AUTO: 10.83 K/UL — HIGH (ref 4.8–10.8)
WBC # FLD AUTO: 11.09 K/UL — HIGH (ref 4.8–10.8)
WBC # FLD AUTO: 9.39 K/UL — SIGNIFICANT CHANGE UP (ref 4.8–10.8)

## 2025-03-19 PROCEDURE — 99221 1ST HOSP IP/OBS SF/LOW 40: CPT

## 2025-03-19 PROCEDURE — 80074 ACUTE HEPATITIS PANEL: CPT

## 2025-03-19 PROCEDURE — 99223 1ST HOSP IP/OBS HIGH 75: CPT

## 2025-03-19 PROCEDURE — 86696 HERPES SIMPLEX TYPE 2 TEST: CPT

## 2025-03-19 PROCEDURE — 82746 ASSAY OF FOLIC ACID SERUM: CPT

## 2025-03-19 PROCEDURE — 86645 CMV ANTIBODY IGM: CPT

## 2025-03-19 PROCEDURE — 86900 BLOOD TYPING SEROLOGIC ABO: CPT

## 2025-03-19 PROCEDURE — 36430 TRANSFUSION BLD/BLD COMPNT: CPT

## 2025-03-19 PROCEDURE — 80307 DRUG TEST PRSMV CHEM ANLYZR: CPT

## 2025-03-19 PROCEDURE — 86695 HERPES SIMPLEX TYPE 1 TEST: CPT

## 2025-03-19 PROCEDURE — 80354 DRUG SCREENING FENTANYL: CPT

## 2025-03-19 PROCEDURE — 82728 ASSAY OF FERRITIN: CPT

## 2025-03-19 PROCEDURE — 83735 ASSAY OF MAGNESIUM: CPT

## 2025-03-19 PROCEDURE — 85025 COMPLETE CBC W/AUTO DIFF WBC: CPT

## 2025-03-19 PROCEDURE — 99222 1ST HOSP IP/OBS MODERATE 55: CPT

## 2025-03-19 PROCEDURE — 80048 BASIC METABOLIC PNL TOTAL CA: CPT

## 2025-03-19 PROCEDURE — 85027 COMPLETE CBC AUTOMATED: CPT

## 2025-03-19 PROCEDURE — 93005 ELECTROCARDIOGRAM TRACING: CPT

## 2025-03-19 PROCEDURE — 86038 ANTINUCLEAR ANTIBODIES: CPT

## 2025-03-19 PROCEDURE — P9016: CPT

## 2025-03-19 PROCEDURE — 43244 EGD VARICES LIGATION: CPT | Mod: XU

## 2025-03-19 PROCEDURE — 36415 COLL VENOUS BLD VENIPUNCTURE: CPT

## 2025-03-19 PROCEDURE — 87521 HEPATITIS C PROBE&RVRS TRNSC: CPT

## 2025-03-19 PROCEDURE — 86850 RBC ANTIBODY SCREEN: CPT

## 2025-03-19 PROCEDURE — 87389 HIV-1 AG W/HIV-1&-2 AB AG IA: CPT

## 2025-03-19 PROCEDURE — 99223 1ST HOSP IP/OBS HIGH 75: CPT | Mod: FS,25

## 2025-03-19 PROCEDURE — 86255 FLUORESCENT ANTIBODY SCREEN: CPT

## 2025-03-19 PROCEDURE — 84100 ASSAY OF PHOSPHORUS: CPT

## 2025-03-19 PROCEDURE — 82103 ALPHA-1-ANTITRYPSIN TOTAL: CPT

## 2025-03-19 PROCEDURE — 83550 IRON BINDING TEST: CPT

## 2025-03-19 PROCEDURE — 83540 ASSAY OF IRON: CPT

## 2025-03-19 PROCEDURE — 80053 COMPREHEN METABOLIC PANEL: CPT

## 2025-03-19 PROCEDURE — 80076 HEPATIC FUNCTION PANEL: CPT

## 2025-03-19 PROCEDURE — 82390 ASSAY OF CERULOPLASMIN: CPT

## 2025-03-19 PROCEDURE — 86381 MITOCHONDRIAL ANTIBODY EACH: CPT

## 2025-03-19 PROCEDURE — 85610 PROTHROMBIN TIME: CPT

## 2025-03-19 PROCEDURE — 86901 BLOOD TYPING SEROLOGIC RH(D): CPT

## 2025-03-19 PROCEDURE — 93307 TTE W/O DOPPLER COMPLETE: CPT

## 2025-03-19 PROCEDURE — 84145 PROCALCITONIN (PCT): CPT

## 2025-03-19 PROCEDURE — 86644 CMV ANTIBODY: CPT

## 2025-03-19 PROCEDURE — 82607 VITAMIN B-12: CPT

## 2025-03-19 RX ORDER — MAGNESIUM SULFATE 500 MG/ML
2 SYRINGE (ML) INJECTION ONCE
Refills: 0 | Status: COMPLETED | OUTPATIENT
Start: 2025-03-19 | End: 2025-03-19

## 2025-03-19 RX ORDER — OCTREOTIDE ACETATE 500 UG/ML
50 INJECTION, SOLUTION INTRAVENOUS; SUBCUTANEOUS
Qty: 500 | Refills: 0 | Status: DISCONTINUED | OUTPATIENT
Start: 2025-03-19 | End: 2025-03-21

## 2025-03-19 RX ORDER — METOPROLOL SUCCINATE 50 MG/1
50 TABLET, EXTENDED RELEASE ORAL DAILY
Refills: 0 | Status: DISCONTINUED | OUTPATIENT
Start: 2025-03-19 | End: 2025-03-19

## 2025-03-19 RX ORDER — AMLODIPINE BESYLATE 10 MG/1
5 TABLET ORAL DAILY
Refills: 0 | Status: DISCONTINUED | OUTPATIENT
Start: 2025-03-19 | End: 2025-03-19

## 2025-03-19 RX ORDER — FOLIC ACID 1 MG/1
1 TABLET ORAL DAILY
Refills: 0 | Status: DISCONTINUED | OUTPATIENT
Start: 2025-03-19 | End: 2025-03-23

## 2025-03-19 RX ORDER — NICOTINE POLACRILEX 4 MG/1
1 GUM, CHEWING ORAL DAILY
Refills: 0 | Status: DISCONTINUED | OUTPATIENT
Start: 2025-03-19 | End: 2025-03-23

## 2025-03-19 RX ORDER — OCTREOTIDE ACETATE 500 UG/ML
125 INJECTION, SOLUTION INTRAVENOUS; SUBCUTANEOUS
Qty: 500 | Refills: 0 | Status: DISCONTINUED | OUTPATIENT
Start: 2025-03-19 | End: 2025-03-19

## 2025-03-19 RX ORDER — B1/B2/B3/B5/B6/B12/VIT C/FOLIC 500-0.5 MG
1 TABLET ORAL DAILY
Refills: 0 | Status: DISCONTINUED | OUTPATIENT
Start: 2025-03-19 | End: 2025-03-23

## 2025-03-19 RX ORDER — CEFTRIAXONE 500 MG/1
1000 INJECTION, POWDER, FOR SOLUTION INTRAMUSCULAR; INTRAVENOUS ONCE
Refills: 0 | Status: COMPLETED | OUTPATIENT
Start: 2025-03-19 | End: 2025-03-19

## 2025-03-19 RX ORDER — SODIUM CHLORIDE 9 G/1000ML
1000 INJECTION, SOLUTION INTRAVENOUS
Refills: 0 | Status: DISCONTINUED | OUTPATIENT
Start: 2025-03-19 | End: 2025-03-21

## 2025-03-19 RX ORDER — CEFTRIAXONE 500 MG/1
1000 INJECTION, POWDER, FOR SOLUTION INTRAMUSCULAR; INTRAVENOUS EVERY 24 HOURS
Refills: 0 | Status: DISCONTINUED | OUTPATIENT
Start: 2025-03-20 | End: 2025-03-23

## 2025-03-19 RX ORDER — LORAZEPAM 4 MG/ML
2 VIAL (ML) INJECTION EVERY 4 HOURS
Refills: 0 | Status: DISCONTINUED | OUTPATIENT
Start: 2025-03-19 | End: 2025-03-23

## 2025-03-19 RX ORDER — OCTREOTIDE ACETATE 500 UG/ML
50 INJECTION, SOLUTION INTRAVENOUS; SUBCUTANEOUS ONCE
Refills: 0 | Status: COMPLETED | OUTPATIENT
Start: 2025-03-19 | End: 2025-03-19

## 2025-03-19 RX ORDER — ONDANSETRON HCL/PF 4 MG/2 ML
4 VIAL (ML) INJECTION ONCE
Refills: 0 | Status: COMPLETED | OUTPATIENT
Start: 2025-03-19 | End: 2025-03-19

## 2025-03-19 RX ADMIN — Medication 4 MILLIGRAM(S): at 02:02

## 2025-03-19 RX ADMIN — OCTREOTIDE ACETATE 50 MICROGRAM(S): 500 INJECTION, SOLUTION INTRAVENOUS; SUBCUTANEOUS at 01:47

## 2025-03-19 RX ADMIN — Medication 10 MG/HR: at 00:15

## 2025-03-19 RX ADMIN — SODIUM CHLORIDE 75 MILLILITER(S): 9 INJECTION, SOLUTION INTRAVENOUS at 04:31

## 2025-03-19 RX ADMIN — Medication 10 MG/HR: at 23:22

## 2025-03-19 RX ADMIN — AMLODIPINE BESYLATE 5 MILLIGRAM(S): 10 TABLET ORAL at 06:17

## 2025-03-19 RX ADMIN — Medication 1000 MILLILITER(S): at 00:14

## 2025-03-19 RX ADMIN — Medication 100 MILLIGRAM(S): at 12:11

## 2025-03-19 RX ADMIN — OCTREOTIDE ACETATE 25 MICROGRAM(S)/HR: 500 INJECTION, SOLUTION INTRAVENOUS; SUBCUTANEOUS at 01:50

## 2025-03-19 RX ADMIN — METOPROLOL SUCCINATE 50 MILLIGRAM(S): 50 TABLET, EXTENDED RELEASE ORAL at 06:15

## 2025-03-19 RX ADMIN — Medication 1 APPLICATION(S): at 06:17

## 2025-03-19 RX ADMIN — Medication 80 MILLIGRAM(S): at 00:14

## 2025-03-19 RX ADMIN — CEFTRIAXONE 100 MILLIGRAM(S): 500 INJECTION, POWDER, FOR SOLUTION INTRAMUSCULAR; INTRAVENOUS at 23:23

## 2025-03-19 RX ADMIN — NICOTINE POLACRILEX 1 PATCH: 4 GUM, CHEWING ORAL at 12:10

## 2025-03-19 RX ADMIN — CEFTRIAXONE 100 MILLIGRAM(S): 500 INJECTION, POWDER, FOR SOLUTION INTRAMUSCULAR; INTRAVENOUS at 01:49

## 2025-03-19 RX ADMIN — OCTREOTIDE ACETATE 25 MICROGRAM(S)/HR: 500 INJECTION, SOLUTION INTRAVENOUS; SUBCUTANEOUS at 04:33

## 2025-03-19 RX ADMIN — Medication 25 GRAM(S): at 04:33

## 2025-03-19 RX ADMIN — FOLIC ACID 1 MILLIGRAM(S): 1 TABLET ORAL at 12:11

## 2025-03-19 RX ADMIN — OCTREOTIDE ACETATE 10 MICROGRAM(S)/HR: 500 INJECTION, SOLUTION INTRAVENOUS; SUBCUTANEOUS at 23:22

## 2025-03-19 RX ADMIN — SODIUM CHLORIDE 75 MILLILITER(S): 9 INJECTION, SOLUTION INTRAVENOUS at 06:15

## 2025-03-19 RX ADMIN — Medication 10 MG/HR: at 04:32

## 2025-03-19 RX ADMIN — OCTREOTIDE ACETATE 10 MICROGRAM(S)/HR: 500 INJECTION, SOLUTION INTRAVENOUS; SUBCUTANEOUS at 06:14

## 2025-03-19 RX ADMIN — NICOTINE POLACRILEX 1 PATCH: 4 GUM, CHEWING ORAL at 20:17

## 2025-03-19 RX ADMIN — Medication 1 TABLET(S): at 12:11

## 2025-03-19 RX ADMIN — Medication 10 MG/HR: at 06:15

## 2025-03-19 RX ADMIN — Medication 25 GRAM(S): at 01:49

## 2025-03-19 RX ADMIN — SODIUM CHLORIDE 75 MILLILITER(S): 9 INJECTION, SOLUTION INTRAVENOUS at 23:22

## 2025-03-19 NOTE — H&P ADULT - NSHPPHYSICALEXAM_GEN_ALL_CORE
PHYSICAL EXAM:  GENERAL: NAD, well-groomed, well-developed  HEAD:  Atraumatic, Normocephalic  EYES: left eye ecchymosis   ENMT: No tonsillar erythema, exudates, or enlargement; Moist mucous membranes, Good dentition, No lesions  NECK: Supple, No JVD, Normal thyroid  NERVOUS SYSTEM:  Alert & Oriented X3, Good concentration; Motor Strength 5/5 B/L upper and lower extremities; DTRs 2+ intact and symmetric  CHEST/LUNG: Clear to percussion bilaterally; No rales, rhonchi, wheezing, or rubs  HEART: irregular   ABDOMEN: Soft, Nontender, Nondistended; Bowel sounds present  EXTREMITIES:  2+ Peripheral Pulses, No clubbing, cyanosis, or edema

## 2025-03-19 NOTE — CONSULT NOTE ADULT - SUBJECTIVE AND OBJECTIVE BOX
Chief complaint/Reason for consult: upper GI bleed     HPI:   Patient is a 69y old  Male who presents with a chief complaint of  dizziness. Pt orthostatic. Pt also fell in bathroom. Pt w/ hx alcohol use disorder ,tobacco use  denied: nsaids abuse     (19 Mar 2025 01:37)    GI updates: 69yMale pmh A-fib on eliquis last dose yesterday, active ETOH use, active cigarette smoking, presents for weakness. Patient reports melena intermittently for a week. patient takes advil as well 2-3 daily for knee pain. Currently Patient denies nausea, vomiting, hematemesis, diarrhea, constipation, abdominal pain. Patient has never had Colonoscopy before.  +melena      PAST MEDICAL & SURGICAL HISTORY: PAST MEDICAL & SURGICAL HISTORY:  Atrial fibrillation  Seizures  Collar bone fracture            Family history:  FAMILY HISTORY:  No pertinent family history in first degree relatives      No GI cancers in first or second degree relatives    Social History: No smoking. No alcohol. No illegal drug use.    Allergies:   No Known Allergies        MEDICATIONS: Home Medications:  Eliquis 5 mg oral tablet: 1 tab(s) orally 2 times a day (19 Mar 2025 03:07)  Metoprolol Succinate ER 50 mg oral tablet, extended release: 1 tab(s) orally once a day (at bedtime) (19 Mar 2025 03:07)    MEDICATIONS  (STANDING):  chlorhexidine 2% Cloths 1 Application(s) Topical <User Schedule>  dextrose 5% + sodium chloride 0.9%. 1000 milliLiter(s) (75 mL/Hr) IV Continuous <Continuous>  folic acid 1 milliGRAM(s) Oral daily  multivitamin 1 Tablet(s) Oral daily  nicotine -  14 mG/24Hr(s) Patch 1 Patch Transdermal daily  octreotide  Infusion 50 MICROgram(s)/Hr (10 mL/Hr) IV Continuous <Continuous>  pantoprazole Infusion 8 mG/Hr (10 mL/Hr) IV Continuous <Continuous>  thiamine 100 milliGRAM(s) Oral daily    MEDICATIONS  (PRN):  LORazepam   Injectable 2 milliGRAM(s) IV Push every 4 hours PRN CIWA 8-14        REVIEW OF SYSTEMS  General:  No weight loss, fevers, or chills.  Eyes:  No reported pain or visual changes  ENT:  No sore throat or runny nose.  NECK: No stiffness   CV:  No chest pain or palpitations.  Resp:  No shortness of breath, cough, wheezing or hemoptysis  GI:  No abdominal pain, nausea, vomiting, dysphagia, diarrhea or constipation. +melena, no hematemesis.  Neuro:  No tingling, numbness       VITALS:   T(F): 97.2 (03-19-25 @ 11:00), Max: 98.7 (03-18-25 @ 21:53)  HR: 107 (03-19-25 @ 07:00) (102 - 125)  BP: 122/71 (03-19-25 @ 07:00) (105/55 - 122/71)  RR: 22 (03-19-25 @ 11:00) (16 - 23)  SpO2: 99% (03-19-25 @ 07:00) (98% - 100%)    PHYSICAL EXAM:  GENERAL: AAOx3, no acute distress.  HEAD:  Atraumatic, Normocephalic  EYES: conjunctiva and sclera clear  NECK: Supple, No thyromegaly   CHEST/LUNG: Clear to auscultation bilaterally; No wheeze, rhonchi, or rales  HEART: Regular rate and rhythm; normal S1, S2, No murmurs.  ABDOMEN: Soft, nontender, nondistended; Bowel sounds present  NEUROLOGY: No asterixis or tremor  SKIN: Intact, no jaundice  Rectal exam-melenic stool in rectal vault and on finger        LABS:  03-18    138  |  108  |  26[H]  ----------------------------<  126[H]  4.4   |  22  |  0.7    Ca    8.0[L]      18 Mar 2025 22:36  Mg     1.6     03-18    TPro  5.3[L]  /  Alb  2.8[L]  /  TBili  0.8  /  DBili  x   /  AST  90[H]  /  ALT  58[H]  /  AlkPhos  129[H]  03-18                          6.3    8.59  )-----------( 133      ( 18 Mar 2025 22:36 )             19.8     LIVER FUNCTIONS - ( 18 Mar 2025 22:36 )  Alb: 2.8 g/dL / Pro: 5.3 g/dL / ALK PHOS: 129 U/L / ALT: 58 U/L / AST: 90 U/L / GGT: x           PT/INR - ( 18 Mar 2025 22:36 )   PT: 19.60 sec;   INR: 1.64 ratio         PTT - ( 18 Mar 2025 22:36 )  PTT:27.7 sec    IMAGING:    < from: CT Abdomen and Pelvis w/ IV Cont (03.18.25 @ 22:43) >    ACC: [id number] EXAM:  CT ABDOMEN AND PELVIS IC   ORDERED BY: MADIHA WATTS     ACC: [id number] EXAM:  CT CHEST IC   ORDERED BY: MADIHA WATTS     PROCEDURE DATE:  03/18/2025          INTERPRETATION:  STUDY INDICATION: trauma    TECHNIQUE: CT of the chest, abdomen and pelvis was performed following   administration of IV contrast. Oral contrast was not administered.    Reformatted images in the coronal and sagittal planes were acquired.  CONTRAST VOLUME: Omnipaque 350 (accession [id number]), NONE (accession   [id number]). 90 cc administered. 10 cc discarded.    COMPARISON CT: None.    QUALITY STATEMENT: Patient upper extremities causing streak artifact   inherently degrades image quality and limits this exam.    FINDINGS:    CHEST    MEDIASTINUM/LYMPH NODES: No lymphadenopathy by size criteria..    HEART/GREAT VESSELS: Left atrial enlargement. No pericardial effusion.   Normal caliber aorta.  Multivessel coronary arterial calcifications.   Aortic valvular calcifications.    LUNGS, PLEURA,AND AIRWAYS: Central airways are patent. No mass or   consolidation. No pneumothorax or pleural effusion.    ABDOMEN/PELVIS    HEPATOBILIARY: Cirrhotic. Cholelithiasis..    SPLEEN: Unremarkable.    PANCREAS: Unremarkable.    ADRENAL GLANDS: Unremarkable.    KIDNEYS: Unremarkable.    ABDOMINOPELVIC NODES: Unremarkable.    PELVIC ORGANS: Unremarkable.    PERITONEUM/MESENTERY/BOWEL: No bowel obstruction, pneumoperitoneum or   pneumatosis. Small ascites.  Normal appendix.    BONES/SOFT TISSUES: No acute osseous abnormality.    OTHER/VASCULAR: Normal caliber aorta.      IMPRESSION:    No evidence of an acute traumatic solid organ or osseous injury.    Cirrhotic liver. Small ascites.    --- End of Report ---            LIZZY BRADY MD; Attending Radiologist  This document has been electronically signed. Mar 19 2025 12:14AM    < end of copied text >       Chief complaint/Reason for consult: upper GI bleed     HPI:   Patient is a 69y old  Male who presents with a chief complaint of  dizziness. Pt orthostatic. Pt also fell in bathroom. Pt w/ hx alcohol use disorder ,tobacco use  denied: nsaids abuse     (19 Mar 2025 01:37)    GI updates: 69yMale pmh A-fib on eliquis last dose yesterday, active ETOH use, active cigarette smoking, presents for weakness. Patient reports melena intermittently for a week. patient takes advil as well 2-3 daily for knee pain. Currently Patient denies nausea, vomiting, hematemesis, diarrhea, constipation, abdominal pain. Patient has never had Colonoscopy before.  +melena      PAST MEDICAL & SURGICAL HISTORY: PAST MEDICAL & SURGICAL HISTORY:  Atrial fibrillation  Seizures  Collar bone fracture            Family history:  FAMILY HISTORY:  No pertinent family history in first degree relatives      No GI cancers in first or second degree relatives    Social History: +cigarette smoking. + alcohol. No illegal drug use.    Allergies:   No Known Allergies        MEDICATIONS: Home Medications:  Eliquis 5 mg oral tablet: 1 tab(s) orally 2 times a day (19 Mar 2025 03:07)  Metoprolol Succinate ER 50 mg oral tablet, extended release: 1 tab(s) orally once a day (at bedtime) (19 Mar 2025 03:07)    MEDICATIONS  (STANDING):  chlorhexidine 2% Cloths 1 Application(s) Topical <User Schedule>  dextrose 5% + sodium chloride 0.9%. 1000 milliLiter(s) (75 mL/Hr) IV Continuous <Continuous>  folic acid 1 milliGRAM(s) Oral daily  multivitamin 1 Tablet(s) Oral daily  nicotine -  14 mG/24Hr(s) Patch 1 Patch Transdermal daily  octreotide  Infusion 50 MICROgram(s)/Hr (10 mL/Hr) IV Continuous <Continuous>  pantoprazole Infusion 8 mG/Hr (10 mL/Hr) IV Continuous <Continuous>  thiamine 100 milliGRAM(s) Oral daily    MEDICATIONS  (PRN):  LORazepam   Injectable 2 milliGRAM(s) IV Push every 4 hours PRN CIWA 8-14        REVIEW OF SYSTEMS  General:  No weight loss, fevers, or chills.  Eyes:  No reported pain or visual changes  ENT:  No sore throat or runny nose.  NECK: No stiffness   CV:  No chest pain or palpitations.  Resp:  No shortness of breath, cough, wheezing or hemoptysis  GI:  No abdominal pain, nausea, vomiting, dysphagia, diarrhea or constipation. +melena, no hematemesis.  Neuro:  No tingling, numbness       VITALS:   T(F): 97.2 (03-19-25 @ 11:00), Max: 98.7 (03-18-25 @ 21:53)  HR: 107 (03-19-25 @ 07:00) (102 - 125)  BP: 122/71 (03-19-25 @ 07:00) (105/55 - 122/71)  RR: 22 (03-19-25 @ 11:00) (16 - 23)  SpO2: 99% (03-19-25 @ 07:00) (98% - 100%)    PHYSICAL EXAM:  GENERAL: AAOx3, no acute distress.  HEAD:  Atraumatic, Normocephalic  EYES: conjunctiva and sclera clear  NECK: Supple, No thyromegaly   CHEST/LUNG: Clear to auscultation bilaterally; No wheeze, rhonchi, or rales  HEART: Regular rate and rhythm; normal S1, S2, No murmurs.  ABDOMEN: Soft, nontender, nondistended; Bowel sounds present  NEUROLOGY: No asterixis or tremor  SKIN: Intact, no jaundice  Rectal exam-melenic stool in rectal vault and on finger        LABS:  03-18    138  |  108  |  26[H]  ----------------------------<  126[H]  4.4   |  22  |  0.7    Ca    8.0[L]      18 Mar 2025 22:36  Mg     1.6     03-18    TPro  5.3[L]  /  Alb  2.8[L]  /  TBili  0.8  /  DBili  x   /  AST  90[H]  /  ALT  58[H]  /  AlkPhos  129[H]  03-18                          6.3    8.59  )-----------( 133      ( 18 Mar 2025 22:36 )             19.8     LIVER FUNCTIONS - ( 18 Mar 2025 22:36 )  Alb: 2.8 g/dL / Pro: 5.3 g/dL / ALK PHOS: 129 U/L / ALT: 58 U/L / AST: 90 U/L / GGT: x           PT/INR - ( 18 Mar 2025 22:36 )   PT: 19.60 sec;   INR: 1.64 ratio         PTT - ( 18 Mar 2025 22:36 )  PTT:27.7 sec    IMAGING:    < from: CT Abdomen and Pelvis w/ IV Cont (03.18.25 @ 22:43) >    ACC: [id number] EXAM:  CT ABDOMEN AND PELVIS IC   ORDERED BY: MADIHA WATTS     ACC: [id number] EXAM:  CT CHEST IC   ORDERED BY: MADIHA WATTS     PROCEDURE DATE:  03/18/2025          INTERPRETATION:  STUDY INDICATION: trauma    TECHNIQUE: CT of the chest, abdomen and pelvis was performed following   administration of IV contrast. Oral contrast was not administered.    Reformatted images in the coronal and sagittal planes were acquired.  CONTRAST VOLUME: Omnipaque 350 (accession [id number]), NONE (accession   [id number]). 90 cc administered. 10 cc discarded.    COMPARISON CT: None.    QUALITY STATEMENT: Patient upper extremities causing streak artifact   inherently degrades image quality and limits this exam.    FINDINGS:    CHEST    MEDIASTINUM/LYMPH NODES: No lymphadenopathy by size criteria..    HEART/GREAT VESSELS: Left atrial enlargement. No pericardial effusion.   Normal caliber aorta.  Multivessel coronary arterial calcifications.   Aortic valvular calcifications.    LUNGS, PLEURA,AND AIRWAYS: Central airways are patent. No mass or   consolidation. No pneumothorax or pleural effusion.    ABDOMEN/PELVIS    HEPATOBILIARY: Cirrhotic. Cholelithiasis..    SPLEEN: Unremarkable.    PANCREAS: Unremarkable.    ADRENAL GLANDS: Unremarkable.    KIDNEYS: Unremarkable.    ABDOMINOPELVIC NODES: Unremarkable.    PELVIC ORGANS: Unremarkable.    PERITONEUM/MESENTERY/BOWEL: No bowel obstruction, pneumoperitoneum or   pneumatosis. Small ascites.  Normal appendix.    BONES/SOFT TISSUES: No acute osseous abnormality.    OTHER/VASCULAR: Normal caliber aorta.      IMPRESSION:    No evidence of an acute traumatic solid organ or osseous injury.    Cirrhotic liver. Small ascites.    --- End of Report ---            LIZZY BRADY MD; Attending Radiologist  This document has been electronically signed. Mar 19 2025 12:14AM    < end of copied text >       Chief complaint/Reason for consult: upper GI bleed     HPI:   Patient is a 69y old  Male who presents with a chief complaint of  dizziness. Pt orthostatic. Pt also fell in bathroom. Pt w/ hx alcohol use disorder ,tobacco use  denied: nsaids abuse     (19 Mar 2025 01:37)    GI updates: 69yMale PMH  A-fib on Eliquis last dose yesterday, active ETOH use, active cigarette smoking, presents for weakness. Patient reports melena intermittently for a week. Patient takes Advil as well 2-3 daily for knee pain. Currently Patient denies nausea, vomiting, hematemesis, diarrhea, constipation, abdominal pain. Patient has never had Colonoscopy before.  + Melena      PAST MEDICAL & SURGICAL HISTORY: PAST MEDICAL & SURGICAL HISTORY:  Atrial fibrillation  Seizures  Collar bone fracture            Family history:  FAMILY HISTORY:  No pertinent family history in first degree relatives      No GI cancers in first or second degree relatives    Social History: +cigarette smoking. + alcohol. No illegal drug use.    Allergies:   No Known Allergies        MEDICATIONS: Home Medications:  Eliquis 5 mg oral tablet: 1 tab(s) orally 2 times a day (19 Mar 2025 03:07)  Metoprolol Succinate ER 50 mg oral tablet, extended release: 1 tab(s) orally once a day (at bedtime) (19 Mar 2025 03:07)    MEDICATIONS  (STANDING):  chlorhexidine 2% Cloths 1 Application(s) Topical <User Schedule>  dextrose 5% + sodium chloride 0.9%. 1000 milliLiter(s) (75 mL/Hr) IV Continuous <Continuous>  folic acid 1 milliGRAM(s) Oral daily  multivitamin 1 Tablet(s) Oral daily  nicotine -  14 mG/24Hr(s) Patch 1 Patch Transdermal daily  octreotide  Infusion 50 MICROgram(s)/Hr (10 mL/Hr) IV Continuous <Continuous>  pantoprazole Infusion 8 mG/Hr (10 mL/Hr) IV Continuous <Continuous>  thiamine 100 milliGRAM(s) Oral daily    MEDICATIONS  (PRN):  LORazepam   Injectable 2 milliGRAM(s) IV Push every 4 hours PRN CIWA 8-14        REVIEW OF SYSTEMS  General:  No weight loss, fevers, or chills.  Eyes:  No reported pain or visual changes  ENT:  No sore throat or runny nose.  NECK: No stiffness   CV:  No chest pain or palpitations.  Resp:  No shortness of breath, cough, wheezing or hemoptysis  GI:  No abdominal pain, nausea, vomiting, dysphagia, diarrhea or constipation. +melena, no hematemesis.  Neuro:  No tingling, numbness       VITALS:   T(F): 97.2 (03-19-25 @ 11:00), Max: 98.7 (03-18-25 @ 21:53)  HR: 107 (03-19-25 @ 07:00) (102 - 125)  BP: 122/71 (03-19-25 @ 07:00) (105/55 - 122/71)  RR: 22 (03-19-25 @ 11:00) (16 - 23)  SpO2: 99% (03-19-25 @ 07:00) (98% - 100%)    PHYSICAL EXAM:  GENERAL: AAOx3, no acute distress.  HEAD:  Atraumatic, Normocephalic  EYES: conjunctiva and sclera clear  NECK: Supple, No thyromegaly   CHEST/LUNG: Clear to auscultation bilaterally; No wheeze, rhonchi, or rales  HEART: Regular rate and rhythm; normal S1, S2, No murmurs.  ABDOMEN: Soft, nontender, nondistended; Bowel sounds present  NEUROLOGY: No asterixis or tremor  SKIN: Intact, no jaundice  Rectal exam-melenic stool in rectal vault and on finger        LABS:  03-18    138  |  108  |  26[H]  ----------------------------<  126[H]  4.4   |  22  |  0.7    Ca    8.0[L]      18 Mar 2025 22:36  Mg     1.6     03-18    TPro  5.3[L]  /  Alb  2.8[L]  /  TBili  0.8  /  DBili  x   /  AST  90[H]  /  ALT  58[H]  /  AlkPhos  129[H]  03-18                          6.3    8.59  )-----------( 133      ( 18 Mar 2025 22:36 )             19.8     LIVER FUNCTIONS - ( 18 Mar 2025 22:36 )  Alb: 2.8 g/dL / Pro: 5.3 g/dL / ALK PHOS: 129 U/L / ALT: 58 U/L / AST: 90 U/L / GGT: x           PT/INR - ( 18 Mar 2025 22:36 )   PT: 19.60 sec;   INR: 1.64 ratio         PTT - ( 18 Mar 2025 22:36 )  PTT:27.7 sec    IMAGING:    < from: CT Abdomen and Pelvis w/ IV Cont (03.18.25 @ 22:43) >    ACC: [id number] EXAM:  CT ABDOMEN AND PELVIS IC   ORDERED BY: MADIHA WATTS     ACC: [id number] EXAM:  CT CHEST IC   ORDERED BY: MADIHA WATTS     PROCEDURE DATE:  03/18/2025          INTERPRETATION:  STUDY INDICATION: trauma    TECHNIQUE: CT of the chest, abdomen and pelvis was performed following   administration of IV contrast. Oral contrast was not administered.    Reformatted images in the coronal and sagittal planes were acquired.  CONTRAST VOLUME: Omnipaque 350 (accession [id number]), NONE (accession   [id number]). 90 cc administered. 10 cc discarded.    COMPARISON CT: None.    QUALITY STATEMENT: Patient upper extremities causing streak artifact   inherently degrades image quality and limits this exam.    FINDINGS:    CHEST    MEDIASTINUM/LYMPH NODES: No lymphadenopathy by size criteria..    HEART/GREAT VESSELS: Left atrial enlargement. No pericardial effusion.   Normal caliber aorta.  Multivessel coronary arterial calcifications.   Aortic valvular calcifications.    LUNGS, PLEURA,AND AIRWAYS: Central airways are patent. No mass or   consolidation. No pneumothorax or pleural effusion.    ABDOMEN/PELVIS    HEPATOBILIARY: Cirrhotic. Cholelithiasis..    SPLEEN: Unremarkable.    PANCREAS: Unremarkable.    ADRENAL GLANDS: Unremarkable.    KIDNEYS: Unremarkable.    ABDOMINOPELVIC NODES: Unremarkable.    PELVIC ORGANS: Unremarkable.    PERITONEUM/MESENTERY/BOWEL: No bowel obstruction, pneumoperitoneum or   pneumatosis. Small ascites.  Normal appendix.    BONES/SOFT TISSUES: No acute osseous abnormality.    OTHER/VASCULAR: Normal caliber aorta.      IMPRESSION:    No evidence of an acute traumatic solid organ or osseous injury.    Cirrhotic liver. Small ascites.    --- End of Report ---            LIZZY BRADY MD; Attending Radiologist  This document has been electronically signed. Mar 19 2025 12:14AM    < end of copied text >

## 2025-03-19 NOTE — CONSULT NOTE ADULT - SUBJECTIVE AND OBJECTIVE BOX
Patient is a 69y old  Male who presents with a chief complaint of  dizziness. Pt orthostatic. Pt also fell in bathroom. Pt w/ hx alcohol use disorder ,tobacco use  denied: nsaids abuse.          Pt interviewed, examined and EMR chart reviewed.  Pt admits to drinking--- x    months. Last Drink   Hx of withdrawal   variable periods of sobriety in the past.  Has been in detox before _____yes,   _____No    SOCIAL HISTORY:    REVIEW OF SYSTEMS:    Constitutional: No fever, weight loss or fatigue  ENT:  No difficulty hearing, tinnitus, vertigo; No sinus or throat pain  Neck: No pain or stiffness  Respiratory: No cough, wheezing, chills or hemoptysis  Cardiovascular: No chest pain, palpitations, shortness of breath, dizziness or leg swelling  Gastrointestinal: No abdominal or epigastric pain. No nausea, vomiting or hematemesis; No diarrhea or constipation. No melena or hematochezia.  Neurological: No headaches, memory loss, loss of strength, numbness or tremors  Musculoskeletal: No joint pain or swelling; No muscle, back or extremity pain  Psychiatric: No depression, anxiety, mood swings or difficulty sleeping    MEDICATIONS  (STANDING):  chlorhexidine 2% Cloths 1 Application(s) Topical <User Schedule>  dextrose 5% + sodium chloride 0.9%. 1000 milliLiter(s) (75 mL/Hr) IV Continuous <Continuous>  folic acid 1 milliGRAM(s) Oral daily  multivitamin 1 Tablet(s) Oral daily  nicotine -  14 mG/24Hr(s) Patch 1 Patch Transdermal daily  octreotide  Infusion 50 MICROgram(s)/Hr (10 mL/Hr) IV Continuous <Continuous>  pantoprazole Infusion 8 mG/Hr (10 mL/Hr) IV Continuous <Continuous>  thiamine 100 milliGRAM(s) Oral daily    MEDICATIONS  (PRN):  LORazepam   Injectable 2 milliGRAM(s) IV Push every 4 hours PRN CIWA 8-14      Vital Signs Last 24 Hrs  T(C): 36.6 (19 Mar 2025 07:05), Max: 37.1 (18 Mar 2025 21:53)  T(F): 97.9 (19 Mar 2025 07:05), Max: 98.7 (18 Mar 2025 21:53)  HR: 107 (19 Mar 2025 07:00) (102 - 125)  BP: 122/71 (19 Mar 2025 07:00) (105/55 - 122/71)  BP(mean): 91 (19 Mar 2025 07:00) (79 - 92)  RR: 22 (19 Mar 2025 07:05) (16 - 23)  SpO2: 99% (19 Mar 2025 07:00) (98% - 100%)    Parameters below as of 19 Mar 2025 07:00  Patient On (Oxygen Delivery Method): room air        PHYSICAL EXAM:    Constitutional: NAD, well-groomed, well-developed  HEENT: PERRLA, EOMI, Normal Hearing,  Neck: No LAD, No JVD  Back: Normal spine flexure, No CVA tenderness  Respiratory: CTAB/L  Cardiovascular: S1 and S2, RRR, no M/G/R  Gastrointestinal: BS+, soft, NT/ND  Extremities: No peripheral edema  Neurological: A/O x 3, no focal deficits    LABS:                        6.3    8.59  )-----------( 133      ( 18 Mar 2025 22:36 )             19.8     03-18    138  |  108  |  26[H]  ----------------------------<  126[H]  4.4   |  22  |  0.7    Ca    8.0[L]      18 Mar 2025 22:36  Mg     1.6     03-18    TPro  5.3[L]  /  Alb  2.8[L]  /  TBili  0.8  /  DBili  x   /  AST  90[H]  /  ALT  58[H]  /  AlkPhos  129[H]  03-18    PT/INR - ( 18 Mar 2025 22:36 )   PT: 19.60 sec;   INR: 1.64 ratio         PTT - ( 18 Mar 2025 22:36 )  PTT:27.7 sec  Urinalysis Basic - ( 18 Mar 2025 22:36 )    Color: x / Appearance: x / SG: x / pH: x  Gluc: 126 mg/dL / Ketone: x  / Bili: x / Urobili: x   Blood: x / Protein: x / Nitrite: x   Leuk Esterase: x / RBC: x / WBC x   Sq Epi: x / Non Sq Epi: x / Bacteria: x      Drug Screen Urine:  Alcohol Level        RADIOLOGY & ADDITIONAL STUDIES:    Patient is a 69y old  Male who presents with a chief complaint of  dizziness. Pt orthostatic. Pt also fell in bathroom. Pt w/ hx alcohol use disorder ,tobacco use  denied: nsaids abuse.          Pt interviewed, examined and EMR chart reviewed.  Pt admits to drinking 4-6 beers per day  most of his adult life. Last Drink day before admission.  Denies any Hx of withdrawal   variable periods of sobriety in the past.  Has been in detox before _____yes,   ___X__No      REVIEW OF SYSTEMS:    Constitutional: No fever, weight loss or fatigue  ENT:  No difficulty hearing, tinnitus, vertigo; No sinus or throat pain  Neck: No pain or stiffness  Respiratory: No cough, wheezing, chills or hemoptysis  Cardiovascular: No chest pain, palpitations, shortness of breath, dizziness or leg swelling  Gastrointestinal: No abdominal or epigastric pain. No nausea, vomiting or hematemesis; No diarrhea or constipation. No melena or hematochezia.  Neurological: No headaches, memory loss, loss of strength, numbness or tremors  Musculoskeletal: No joint pain or swelling; No muscle, back or extremity pain  Psychiatric: No depression, anxiety, mood swings or difficulty sleeping    MEDICATIONS  (STANDING):  chlorhexidine 2% Cloths 1 Application(s) Topical <User Schedule>  dextrose 5% + sodium chloride 0.9%. 1000 milliLiter(s) (75 mL/Hr) IV Continuous <Continuous>  folic acid 1 milliGRAM(s) Oral daily  multivitamin 1 Tablet(s) Oral daily  nicotine -  14 mG/24Hr(s) Patch 1 Patch Transdermal daily  octreotide  Infusion 50 MICROgram(s)/Hr (10 mL/Hr) IV Continuous <Continuous>  pantoprazole Infusion 8 mG/Hr (10 mL/Hr) IV Continuous <Continuous>  thiamine 100 milliGRAM(s) Oral daily    MEDICATIONS  (PRN):  LORazepam   Injectable 2 milliGRAM(s) IV Push every 4 hours PRN CIWA 8-14      Vital Signs Last 24 Hrs  T(C): 36.6 (19 Mar 2025 07:05), Max: 37.1 (18 Mar 2025 21:53)  T(F): 97.9 (19 Mar 2025 07:05), Max: 98.7 (18 Mar 2025 21:53)  HR: 107 (19 Mar 2025 07:00) (102 - 125)  BP: 122/71 (19 Mar 2025 07:00) (105/55 - 122/71)  BP(mean): 91 (19 Mar 2025 07:00) (79 - 92)  RR: 22 (19 Mar 2025 07:05) (16 - 23)  SpO2: 99% (19 Mar 2025 07:00) (98% - 100%)    Parameters below as of 19 Mar 2025 07:00  Patient On (Oxygen Delivery Method): room air        PHYSICAL EXAM:    Constitutional: NAD, well-groomed, well-developed  HEENT: PERRLA, EOMI, Normal Hearing,  Neck: No LAD, No JVD  Back: Normal spine flexure, No CVA tenderness  Respiratory: CTAB/L  Cardiovascular: S1 and S2, RRR, no M/G/R  Gastrointestinal: BS+, soft, NT/ND  Extremities: No peripheral edema  Neurological: A/O x 3, no focal deficits    LABS:                        6.3    8.59  )-----------( 133      ( 18 Mar 2025 22:36 )             19.8     03-18    138  |  108  |  26[H]  ----------------------------<  126[H]  4.4   |  22  |  0.7    Ca    8.0[L]      18 Mar 2025 22:36  Mg     1.6     03-18    TPro  5.3[L]  /  Alb  2.8[L]  /  TBili  0.8  /  DBili  x   /  AST  90[H]  /  ALT  58[H]  /  AlkPhos  129[H]  03-18    PT/INR - ( 18 Mar 2025 22:36 )   PT: 19.60 sec;   INR: 1.64 ratio         PTT - ( 18 Mar 2025 22:36 )  PTT:27.7 sec  Urinalysis Basic - ( 18 Mar 2025 22:36 )    Color: x / Appearance: x / SG: x / pH: x  Gluc: 126 mg/dL / Ketone: x  / Bili: x / Urobili: x   Blood: x / Protein: x / Nitrite: x   Leuk Esterase: x / RBC: x / WBC x   Sq Epi: x / Non Sq Epi: x / Bacteria: x      Drug Screen Urine:  Alcohol Level        RADIOLOGY & ADDITIONAL STUDIES:

## 2025-03-19 NOTE — CONSULT NOTE ADULT - ATTENDING COMMENTS
patient seen and examined agree above note   thiamine , folate   ciwa protocol   follow h/h   check INR   PPI drip , octreortide   follow GI   CAt[facility] team

## 2025-03-19 NOTE — CONSULT NOTE ADULT - PROBLEM SELECTOR RECOMMENDATION 9
After evaluation at this time continue CIWA protocol .....Pt should be on Thiamine and Folic acid. Pt will be monitored and supportive care provided.  Would add on Naltrexone.  Obtain UDS if not done already.  Use of Vistaril for anxiety.  Consider adding gabapentin for anxiety standing order and follow up with PMD/Program for continuation of treatment.    Abdominal ultrasound AFP every 6 months for HCC screening  -EGD outpatient for esophageal varices screening   -Follow up with Private GI or our [facility] located at [address] [place] [address]. Phone Number: [phone]  -Alcohol counseling provided   CATCH team involved for aftercare and pt will follow up with aftercare After evaluation at this time continue CIWA protocol .....Pt should be on Thiamine and Folic acid. Pt will be monitored and supportive care provided.  Would add on Naltrexone.  Obtain UDS if not done already.  Use of Vistaril for anxiety.  Consider adding gabapentin for anxiety standing order and follow up with PMD/Program for continuation of treatment.    Abdominal ultrasound AFP every 6 months for HCC screening  -EGD outpatient for esophageal varices screening   -Follow up with Private GI or our [facility] located at [address] [place] [address]. Phone Number: [phone]  -Alcohol counseling provided   CATCH team involved for aftercare and pt will follow up with aftercare    repeat LFTs if improved start on naltrexone 50mg daily

## 2025-03-19 NOTE — ED PROVIDER NOTE - PHYSICAL EXAMINATION
Physical Exam    Vital Signs: I have reviewed the initial vital signs.  Constitutional: well-nourished, appears stated age, no acute distress  Eyes: Conjunctiva pink, Sclera clear, PERRLA, EOMI.  Cardiovascular: Tachy S1 and S2, regular rate, regular rhythm, well-perfused extremities, radial pulses equal and 2+  Respiratory: unlabored respiratory effort, clear to auscultation bilaterally no wheezing, rales and rhonchi  Gastrointestinal: soft, non-tender abdomen, no pulsatile mass, normal bowl sounds  rectal: + black stool, chaperone Dr. Marlon oden obtained   Musculoskeletal: supple neck, no lower extremity edema, no midline tenderness  Integumentary: warm, dry, abrasion to left face  Neurologic: awake, alert, cranial nerves II-XII grossly intact, extremities’ motor and sensory functions grossly intact  Psychiatric: appropriate mood, appropriate affect

## 2025-03-19 NOTE — CONSULT NOTE ADULT - ASSESSMENT
69yMale pmh A-fib on eliquis last dose yesterday, active ETOH use, active cigarette smoking, presents for weakness. Patient reports melena intermittently for a week. patient takes advil as well 2-3 daily for knee pain. Currently Patient denies nausea, vomiting, hematemesis, diarrhea, constipation, abdominal pain. Patient has never had Colonoscopy before.  +melena    Cirrhotic liver  #active ETOH use  #intermittent melenic stools   #acute blood loss anemia   3/19/2025-rectal exam-melenic stool in rectal vault and on finger  ddx PUD, esophageal varices, erosive gastritis  Rec  -EGD today  -The benefits of endoscopy as well as the risks, such as GI bleeding, aspiration pneumonia, perforation, damage or loss of teeth, reaction to medication, or death  were reviewed with the patient.   -spoke with medicine team, patient optimized from medical standpoint   -Maintain Hemodynamic Stability   -Monitor CBC  -CMP,Optimize Electrolytes  -PT,PTT,INR  -EKG, Chest-Xray   -Transfuse prn to hgb >7  -Two large bore IV lines  -Continue PPI   -Monitor Vital Signs  -Keep patient NPO  -Monitor Stool For blood, frequency, consistency, melena  -Hold Anticoagulation if benefits outweigh risks  -Active Type and Screen  -Iron Studies, Folate, Vitamin B12 levels   -avoid N-saids   -Hepatic encephalopathy-none  -Abdominal ultrasound AFP every 6 months for HCC screening  -EGD outpatient for esophageal varices screening   -Check Hepatitis B Sag, Hep B SAB, Hep A Ab, Hep C Ab,Smooth Muscle Antibody, AMA, THONY, Ceruloplasmin, Ferritin, Alpha-1-antitrypsin, CMV, Herpes serologies,   -Follow up with our [facility] located at [address] [place] [address]. Phone Number: [phone] for possible liver transplant referral  -Alcohol and tobacco Cessation Strongly Advised and Encouraged   - For Vaccinations: Please f/u in clinic for being uptodate with HAV/HBV/Influenza/Pneumococcal vaccines.  - Lifestyle/Dietary modifications: Calorie intake 25-40 Kcal/kg/day; Protein intake: 1.2-1.5 gm/kg/day  - Avoid smoking and NSAIDs  -Colonoscopy as outpatient Follow up with our [facility] located at [address] [place] [address]. Phone Number: [phone]  69yMale pmh A-fib on eliquis last dose yesterday, active ETOH use, active cigarette smoking, presents for weakness. Patient reports melena intermittently for a week. patient takes advil as well 2-3 daily for knee pain. Currently Patient denies nausea, vomiting, hematemesis, diarrhea, constipation, abdominal pain. Patient has never had Colonoscopy before.  +melena    Cirrhotic liver  #active ETOH use #altered LFTs  #intermittent melenic stools   #acute blood loss anemia   3/19/2025-rectal exam-melenic stool in rectal vault and on finger  ddx PUD, esophageal varices, erosive gastritis  Rec  -EGD today  -The benefits of endoscopy as well as the risks, such as GI bleeding, aspiration pneumonia, perforation, damage or loss of teeth, reaction to medication, or death  were reviewed with the patient.   -spoke with medicine team, patient optimized from medical standpoint   -Maintain Hemodynamic Stability  -MELD will likely be altered given eliquis and INR   -Monitor CBC  -CMP,Optimize Electrolytes  -PT,PTT,INR  -EKG, Chest-Xray   -Transfuse prn to hgb >7  -Two large bore IV lines  -Continue PPI   -Monitor Vital Signs  -Keep patient NPO  -Monitor Stool For blood, frequency, consistency, melena  -Hold Anticoagulation if benefits outweigh risks  -Active Type and Screen  -Iron Studies, Folate, Vitamin B12 levels   -avoid N-saids   -Hepatic encephalopathy-none  -Abdominal ultrasound AFP every 6 months for HCC screening  -EGD outpatient for esophageal varices screening   -Check Hepatitis B Sag, Hep B SAB, Hep A Ab, Hep C Ab,Smooth Muscle Antibody, AMA, THONY, Ceruloplasmin, Ferritin, Alpha-1-antitrypsin, CMV, Herpes serologies,   -Follow up with our [facility] located at [address] [place] [address]. Phone Number: [phone] for possible liver transplant referral  -Alcohol and tobacco Cessation Strongly Advised and Encouraged   - For Vaccinations: Please f/u in clinic for being uptodate with HAV/HBV/Influenza/Pneumococcal vaccines.  - Lifestyle/Dietary modifications: Calorie intake 25-40 Kcal/kg/day; Protein intake: 1.2-1.5 gm/kg/day  - Avoid smoking and NSAIDs  -Colonoscopy as outpatient Follow up with our [facility] located at [address] [place] [address]. Phone Number: [phone]  69yMale pmh A-fib on eliquis last dose yesterday, active ETOH use, active cigarette smoking, presents for weakness. Patient reports melena intermittently for a week. patient takes advil as well 2-3 daily for knee pain. Currently Patient denies nausea, vomiting, hematemesis, diarrhea, constipation, abdominal pain. Patient has never had Colonoscopy before.  +melena    Cirrhotic liver  #active ETOH use #altered LFTs  #intermittent melenic stools   #acute blood loss anemia   3/19/2025-rectal exam-melenic stool in rectal vault and on finger  ddx PUD, esophageal varices, erosive gastritis  Rec  -EGD today  -The benefits of endoscopy as well as the risks, such as GI bleeding, aspiration pneumonia, perforation, damage or loss of teeth, reaction to medication, or death  were reviewed with the patient.   -spoke with medicine team, patient optimized from medical standpoint   -Maintain Hemodynamic Stability  -MELD will likely be altered given eliquis and INR   -Monitor CBC  -CMP,Optimize Electrolytes  -PT,PTT,INR  -EKG, Chest-Xray   -Transfuse prn to hgb >7  -Two large bore IV lines  -Continue PPI   -Monitor Vital Signs  -Keep patient NPO  -Monitor Stool For blood, frequency, consistency, melena  -Hold Anticoagulation if benefits outweigh risks  -Active Type and Screen  -Iron Studies, Folate, Vitamin B12 levels   -avoid N-saids   -Hepatic encephalopathy-none  -Abdominal ultrasound AFP every 6 months for HCC screening  -EGD outpatient for esophageal varices screening   -Check Hepatitis B Sag, Hep B SAB, Hep A Ab, Hep C Ab,Smooth Muscle Antibody, AMA, THONY, Ceruloplasmin, Ferritin, Alpha-1-antitrypsin, CMV, Herpes serologies,   -Follow up with our [facility] located at [address] [place] [address]. Phone Number: [phone] for possible liver transplant referral  -Alcohol and tobacco Cessation Strongly Advised and Encouraged   - For Vaccinations: Please f/u in clinic for being uptodate with HAV/HBV/Influenza/Pneumococcal vaccines.  - Lifestyle/Dietary modifications: Calorie intake 25-40 Kcal/kg/day; Protein intake: 1.2-1.5 gm/kg/day  - Avoid smoking and NSAIDs  -Colonoscopy as outpatient Follow up with our [facility] located at [address] [place] [address]. Phone Number: [phone]   -Withdrawals per CIWA

## 2025-03-19 NOTE — CONSULT NOTE ADULT - ASSESSMENT
IMPRESSION:  gib  HO afib on eliquis  etoh abuse  cirrhosis  active smoker    PLAN:      CNS: CIWA protocol, etoh level, start thiamine and folate, addiction consult.     HEENT: Oral care    PULMONARY:  HOB @ 45 degrees.  Aspiration precautions, on ra cxr clear    CARDIOVASCULAR: avoid volume overload, MAP adequate, echo, continue ivf,     GI: GI prophylaxis PPI drip, octreotide, gi consult for possible scope today, keep npo.     RENAL:  Follow up lytes.  Correct as needed    INFECTIOUS DISEASE: SBP prophylaxis with ceftriaxone     HEMATOLOGICAL:  hold eliquis, start scd if non ambulatory. s/p 2 u prbc in ed.     ENDOCRINE:  Follow up FS.  Insulin protocol if needed.    MUSCULOSKELETAL: ambulate as tolerated    SDU

## 2025-03-19 NOTE — H&P ADULT - NSHPREVIEWOFSYSTEMS_GEN_ALL_CORE
REVIEW OF SYSTEMS  General:	dizzy   Skin/Breast: no rash 	  Ophthalmologic: no blurry vision 	  ENMT:	no thrush   Respiratory and Thorax: no dyspnea	  Cardiovascular:	 no chest pain   Gastrointestinal: + melena  Genitourinary:	no dysuria   Musculoskeletal:	+ weakness   Neurological:	+ dizziness

## 2025-03-19 NOTE — PRE-ANESTHESIA EVALUATION ADULT - NSANTHOSAYNRD_GEN_A_CORE
No. BENITA screening performed.  STOP BANG Legend: 0-2 = LOW Risk; 3-4 = INTERMEDIATE Risk; 5-8 = HIGH Risk

## 2025-03-19 NOTE — CONSULT NOTE ADULT - SUBJECTIVE AND OBJECTIVE BOX
Patient is a 69y old  Male who presents with a chief complaint of GI bleeding (19 Mar 2025 01:37)      HPI:   Patient is a 69y old  Male who presents with a chief complaint of  dizziness. Pt orthostatic. Pt also fell in bathroom. Pt w/ hx alcohol use disorder ,tobacco use  denied: nsaids abuse     (19 Mar 2025 01:37)      PAST MEDICAL & SURGICAL HISTORY:  Atrial fibrillation      Seizures      Collar bone fracture          SOCIAL HX:   Smoking                         ETOH                            Other    FAMILY HISTORY:  No pertinent family history in first degree relatives    .  No cardiovascular or pulmonary family history     REVIEW OF SYSTEMS:    All ROS are negative exept per HPI       Allergies    No Known Allergies    Intolerances          PHYSICAL EXAM  Vital Signs Last 24 Hrs  T(C): 36.4 (19 Mar 2025 03:27), Max: 37.1 (18 Mar 2025 21:53)  T(F): 97.5 (19 Mar 2025 03:27), Max: 98.7 (18 Mar 2025 21:53)  HR: 110 (19 Mar 2025 06:00) (102 - 125)  BP: 118/78 (19 Mar 2025 06:00) (105/55 - 118/78)  BP(mean): 92 (19 Mar 2025 06:00) (79 - 92)  RR: 18 (19 Mar 2025 06:00) (16 - 20)  SpO2: 98% (19 Mar 2025 06:00) (98% - 100%)    Parameters below as of 19 Mar 2025 06:00  Patient On (Oxygen Delivery Method): room air        CONSTITUTIONAL:  comfortable    ENT:   Airway patent,   No thrush    EYES:   Clear bilaterally,   pupils equal,   round and reactive to light.    CARDIAC:   Normal rate,   regular rhythm.       RESPIRATORY:   No wheezing   Normal chest expansion  Not tachypneic,  No use of accessory muscles    GASTROINTESTINAL:  Abdomen mildly distended, soft  nontender    MUSCULOSKELETAL:   Range of motion is not limited,  No clubbing, cyanosis    NEUROLOGICAL:   Alert and oriented   No motor deficits.    SKIN:   Skin normal color for race,   No evidence of rash.               LABS:                          6.3    8.59  )-----------( 133      ( 18 Mar 2025 22:36 )             19.8                                               03-18    138  |  108  |  26[H]  ----------------------------<  126[H]  4.4   |  22  |  0.7    Ca    8.0[L]      18 Mar 2025 22:36  Mg     1.6     03-18    TPro  5.3[L]  /  Alb  2.8[L]  /  TBili  0.8  /  DBili  x   /  AST  90[H]  /  ALT  58[H]  /  AlkPhos  129[H]  03-18      PT/INR - ( 18 Mar 2025 22:36 )   PT: 19.60 sec;   INR: 1.64 ratio         PTT - ( 18 Mar 2025 22:36 )  PTT:27.7 sec                                       Urinalysis Basic - ( 18 Mar 2025 22:36 )    Color: x / Appearance: x / SG: x / pH: x  Gluc: 126 mg/dL / Ketone: x  / Bili: x / Urobili: x   Blood: x / Protein: x / Nitrite: x   Leuk Esterase: x / RBC: x / WBC x   Sq Epi: x / Non Sq Epi: x / Bacteria: x                                                  LIVER FUNCTIONS - ( 18 Mar 2025 22:36 )  Alb: 2.8 g/dL / Pro: 5.3 g/dL / ALK PHOS: 129 U/L / ALT: 58 U/L / AST: 90 U/L / GGT: x                                                                                                MEDICATIONS  (STANDING):  chlorhexidine 2% Cloths 1 Application(s) Topical <User Schedule>  dextrose 5% + sodium chloride 0.9%. 1000 milliLiter(s) (75 mL/Hr) IV Continuous <Continuous>  folic acid 1 milliGRAM(s) Oral daily  multivitamin 1 Tablet(s) Oral daily  nicotine -  14 mG/24Hr(s) Patch 1 Patch Transdermal daily  octreotide  Infusion 50 MICROgram(s)/Hr (10 mL/Hr) IV Continuous <Continuous>  pantoprazole Infusion 8 mG/Hr (10 mL/Hr) IV Continuous <Continuous>  thiamine 100 milliGRAM(s) Oral daily    MEDICATIONS  (PRN):  LORazepam   Injectable 2 milliGRAM(s) IV Push every 4 hours PRN CIWA 8-14      X-Rays reviewed:    CXR interpreted by me:

## 2025-03-19 NOTE — H&P ADULT - HISTORY OF PRESENT ILLNESS
Patient is a 69y old  Male who presents with a chief complaint of  dizziness. Pt orthostatic. Pt also fell in bathroom.       Patient is a 69y old  Male who presents with a chief complaint of  dizziness. Pt orthostatic. Pt also fell in bathroom. Pt w/ hx alcohol use disorder, denied:  tobacco, nsaids abuse      Patient is a 69y old  Male who presents with a chief complaint of  dizziness. Pt orthostatic. Pt also fell in bathroom. Pt w/ hx alcohol use disorder ,tobacco use  denied: nsaids abuse

## 2025-03-19 NOTE — PACU DISCHARGE NOTE - COMMENTS
Pt now SP TIVA without complication. Back to CCU with EKG/pulse ox/NIBP monitoring for transport. Departing vitals /83 P84 R18 O2 hwd912% Temp NA

## 2025-03-19 NOTE — H&P ADULT - ASSESSMENT
A/P:     1. 69 year old man with Afib, presents with dizziness found to be anemic  - NPO   - transfuse 2 units PRBC  - GI eval   - start octreotide  - replete electrolytes   - IV fluids   - Afib - monitor   - DVT ppx     Time 55 min

## 2025-03-19 NOTE — ED PROVIDER NOTE - CLINICAL SUMMARY MEDICAL DECISION MAKING FREE TEXT BOX
.  69-year-old male on Xarelto for A-fib presents with lightheadedness dizziness with walking and standing since 10 AM first occurred while at work.  Today while walking to the bathroom patient felt lightheaded dizzy had syncopal episode patient got to the bed felt better with lying down.  That occurred 2 hours ago.  Patient mentions 1 week dark-colored stool.  Patient is a daily alcohol user but denies dependence.  Patient sustained close head injury with abrasion to  left side of  head.  Hemoglobin was 6 previously 16 patient normotensive rectal exam melena.  PPI bolus and drip started.  2 units packed red blood cells given.  CT pan scan for trauma no acute traumatic injury revealed cirrhotic liver.  GI consulted.  Octreotide ceftriaxone added.  Patient evaluated by ICU and admitted to ICU.

## 2025-03-19 NOTE — CONSULT NOTE ADULT - ASSESSMENT
Interviewed patient with the PA Zoran MAURICE Patient c/o insomnia in unfamiliar environment, denies any overt sx of etoh withdrawal. Agree with the assessment and plan.

## 2025-03-19 NOTE — ED PROVIDER NOTE - OBJECTIVE STATEMENT
69 year old male past medical history of Hypertension, HLD, atrial fibrillation on eliquis comes to emergency room for syncope. patient states that over the last week has been having black stools and increasing weakness. patient today was walking felt lightheaded and passed out and hit head. no other injuries.

## 2025-03-19 NOTE — H&P ADULT - NSHPLABSRESULTS_GEN_ALL_CORE
labs  03-18    138  |  108  |  26[H]  ----------------------------<  126[H]  4.4   |  22  |  0.7    Ca    8.0[L]      18 Mar 2025 22:36  Mg     1.6     03-18    TPro  5.3[L]  /  Alb  2.8[L]  /  TBili  0.8  /  DBili  x   /  AST  90[H]  /  ALT  58[H]  /  AlkPhos  129[H]  03-18                          6.3    8.59  )-----------( 133      ( 18 Mar 2025 22:36 )             19.8         PT/INR - ( 18 Mar 2025 22:36 )   PT: 19.60 sec;   INR: 1.64 ratio         PTT - ( 18 Mar 2025 22:36 )  PTT:27.7 sec      radiology

## 2025-03-19 NOTE — CHART NOTE - NSCHARTNOTEFT_GEN_A_CORE
patient s/p EGD with esophageal varcies 8 bands placed  -keep octreotide drip  -ppi drip  -npo today  -clears in AM if no further bleeding  -maintain hemodynamic stability

## 2025-03-20 LAB
A1AT SERPL-MCNC: 171 MG/DL — SIGNIFICANT CHANGE UP (ref 90–200)
ALBUMIN SERPL ELPH-MCNC: 2.7 G/DL — LOW (ref 3.5–5.2)
ALP SERPL-CCNC: 110 U/L — SIGNIFICANT CHANGE UP (ref 30–115)
ALT FLD-CCNC: 59 U/L — HIGH (ref 0–41)
AMPHET UR-MCNC: NEGATIVE — SIGNIFICANT CHANGE UP
ANION GAP SERPL CALC-SCNC: 7 MMOL/L — SIGNIFICANT CHANGE UP (ref 7–14)
AST SERPL-CCNC: 98 U/L — HIGH (ref 0–41)
BARBITURATES UR SCN-MCNC: NEGATIVE — SIGNIFICANT CHANGE UP
BENZODIAZ UR-MCNC: NEGATIVE — SIGNIFICANT CHANGE UP
BILIRUB SERPL-MCNC: 1.1 MG/DL — SIGNIFICANT CHANGE UP (ref 0.2–1.2)
BUN SERPL-MCNC: 19 MG/DL — SIGNIFICANT CHANGE UP (ref 10–20)
CALCIUM SERPL-MCNC: 7.4 MG/DL — LOW (ref 8.4–10.5)
CERULOPLASMIN SERPL-MCNC: 24 MG/DL — SIGNIFICANT CHANGE UP (ref 15–30)
CHLORIDE SERPL-SCNC: 114 MMOL/L — HIGH (ref 98–110)
CMV IGG FLD QL: <0.2 U/ML — SIGNIFICANT CHANGE UP
CMV IGG SERPL-IMP: NEGATIVE — SIGNIFICANT CHANGE UP
CMV IGM FLD-ACNC: <8 AU/ML — SIGNIFICANT CHANGE UP
CMV IGM SERPL QL: NEGATIVE — SIGNIFICANT CHANGE UP
CO2 SERPL-SCNC: 22 MMOL/L — SIGNIFICANT CHANGE UP (ref 17–32)
COCAINE METAB.OTHER UR-MCNC: NEGATIVE — SIGNIFICANT CHANGE UP
CREAT SERPL-MCNC: 0.7 MG/DL — SIGNIFICANT CHANGE UP (ref 0.7–1.5)
EGFR: 101 ML/MIN/1.73M2 — SIGNIFICANT CHANGE UP
EGFR: 101 ML/MIN/1.73M2 — SIGNIFICANT CHANGE UP
FENTANYL UR QL: NEGATIVE — SIGNIFICANT CHANGE UP
FERRITIN SERPL-MCNC: 26 NG/ML — LOW (ref 30–400)
FOLATE SERPL-MCNC: 6.2 NG/ML — SIGNIFICANT CHANGE UP
GLUCOSE SERPL-MCNC: 100 MG/DL — HIGH (ref 70–99)
HCT VFR BLD CALC: 23.1 % — LOW (ref 42–52)
HCT VFR BLD CALC: 24.5 % — LOW (ref 42–52)
HGB BLD-MCNC: 7.4 G/DL — LOW (ref 14–18)
HGB BLD-MCNC: 7.9 G/DL — LOW (ref 14–18)
HIV 1+2 AB+HIV1 P24 AG SERPL QL IA: SIGNIFICANT CHANGE UP
HSV1 IGG SER-ACNC: 45.3 INDEX — HIGH
HSV1 IGG SERPL QL IA: POSITIVE
HSV2 IGG FLD-ACNC: 0.62 INDEX — SIGNIFICANT CHANGE UP
HSV2 IGG SERPL QL IA: NEGATIVE — SIGNIFICANT CHANGE UP
INR BLD: 1.24 RATIO — SIGNIFICANT CHANGE UP (ref 0.65–1.3)
IRON SATN MFR SERPL: 13 UG/DL — LOW (ref 35–150)
IRON SATN MFR SERPL: 4 % — LOW (ref 15–50)
MAGNESIUM SERPL-MCNC: 1.8 MG/DL — SIGNIFICANT CHANGE UP (ref 1.8–2.4)
MCHC RBC-ENTMCNC: 27.9 PG — SIGNIFICANT CHANGE UP (ref 27–31)
MCHC RBC-ENTMCNC: 28 PG — SIGNIFICANT CHANGE UP (ref 27–31)
MCHC RBC-ENTMCNC: 32 G/DL — SIGNIFICANT CHANGE UP (ref 32–37)
MCHC RBC-ENTMCNC: 32.2 G/DL — SIGNIFICANT CHANGE UP (ref 32–37)
MCV RBC AUTO: 86.9 FL — SIGNIFICANT CHANGE UP (ref 80–94)
MCV RBC AUTO: 87.2 FL — SIGNIFICANT CHANGE UP (ref 80–94)
METHADONE UR-MCNC: NEGATIVE — SIGNIFICANT CHANGE UP
MITOCHONDRIA AB SER-ACNC: SIGNIFICANT CHANGE UP
NRBC BLD AUTO-RTO: 0 /100 WBCS — SIGNIFICANT CHANGE UP (ref 0–0)
NRBC BLD AUTO-RTO: 0 /100 WBCS — SIGNIFICANT CHANGE UP (ref 0–0)
OPIATES UR-MCNC: NEGATIVE — SIGNIFICANT CHANGE UP
PCP SPEC-MCNC: SIGNIFICANT CHANGE UP
PHOSPHATE SERPL-MCNC: 2.5 MG/DL — SIGNIFICANT CHANGE UP (ref 2.1–4.9)
PLATELET # BLD AUTO: 178 K/UL — SIGNIFICANT CHANGE UP (ref 130–400)
PLATELET # BLD AUTO: 178 K/UL — SIGNIFICANT CHANGE UP (ref 130–400)
PMV BLD: 12.5 FL — HIGH (ref 7.4–10.4)
PMV BLD: 12.7 FL — HIGH (ref 7.4–10.4)
POTASSIUM SERPL-MCNC: 3.9 MMOL/L — SIGNIFICANT CHANGE UP (ref 3.5–5)
POTASSIUM SERPL-SCNC: 3.9 MMOL/L — SIGNIFICANT CHANGE UP (ref 3.5–5)
PROCALCITONIN SERPL-MCNC: 0.19 NG/ML — HIGH (ref 0.02–0.1)
PROPOXYPHENE QUALITATIVE URINE RESULT: NEGATIVE — SIGNIFICANT CHANGE UP
PROT SERPL-MCNC: 5.2 G/DL — LOW (ref 6–8)
PROTHROM AB SERPL-ACNC: 14.7 SEC — HIGH (ref 9.95–12.87)
RBC # BLD: 2.65 M/UL — LOW (ref 4.7–6.1)
RBC # BLD: 2.82 M/UL — LOW (ref 4.7–6.1)
RBC # FLD: 18.3 % — HIGH (ref 11.5–14.5)
RBC # FLD: 18.3 % — HIGH (ref 11.5–14.5)
SMOOTH MUSCLE AB SER-ACNC: SIGNIFICANT CHANGE UP
SODIUM SERPL-SCNC: 143 MMOL/L — SIGNIFICANT CHANGE UP (ref 135–146)
TIBC SERPL-MCNC: 315 UG/DL — SIGNIFICANT CHANGE UP (ref 220–430)
UIBC SERPL-MCNC: 302 UG/DL — SIGNIFICANT CHANGE UP (ref 110–370)
VIT B12 SERPL-MCNC: 799 PG/ML — SIGNIFICANT CHANGE UP (ref 232–1245)
WBC # BLD: 10.25 K/UL — SIGNIFICANT CHANGE UP (ref 4.8–10.8)
WBC # BLD: 10.26 K/UL — SIGNIFICANT CHANGE UP (ref 4.8–10.8)
WBC # FLD AUTO: 10.25 K/UL — SIGNIFICANT CHANGE UP (ref 4.8–10.8)
WBC # FLD AUTO: 10.26 K/UL — SIGNIFICANT CHANGE UP (ref 4.8–10.8)

## 2025-03-20 PROCEDURE — 99233 SBSQ HOSP IP/OBS HIGH 50: CPT

## 2025-03-20 PROCEDURE — 99231 SBSQ HOSP IP/OBS SF/LOW 25: CPT

## 2025-03-20 RX ORDER — RIVAROXABAN 10 MG/1
1 TABLET, FILM COATED ORAL
Refills: 0 | DISCHARGE

## 2025-03-20 RX ADMIN — OCTREOTIDE ACETATE 10 MICROGRAM(S)/HR: 500 INJECTION, SOLUTION INTRAVENOUS; SUBCUTANEOUS at 19:25

## 2025-03-20 RX ADMIN — SODIUM CHLORIDE 75 MILLILITER(S): 9 INJECTION, SOLUTION INTRAVENOUS at 06:24

## 2025-03-20 RX ADMIN — CEFTRIAXONE 100 MILLIGRAM(S): 500 INJECTION, POWDER, FOR SOLUTION INTRAMUSCULAR; INTRAVENOUS at 23:05

## 2025-03-20 RX ADMIN — Medication 10 MG/HR: at 19:25

## 2025-03-20 RX ADMIN — FOLIC ACID 1 MILLIGRAM(S): 1 TABLET ORAL at 11:29

## 2025-03-20 RX ADMIN — Medication 10 MG/HR: at 06:24

## 2025-03-20 RX ADMIN — Medication 1 APPLICATION(S): at 06:24

## 2025-03-20 RX ADMIN — OCTREOTIDE ACETATE 10 MICROGRAM(S)/HR: 500 INJECTION, SOLUTION INTRAVENOUS; SUBCUTANEOUS at 06:23

## 2025-03-20 RX ADMIN — NICOTINE POLACRILEX 1 PATCH: 4 GUM, CHEWING ORAL at 12:00

## 2025-03-20 RX ADMIN — OCTREOTIDE ACETATE 10 MICROGRAM(S)/HR: 500 INJECTION, SOLUTION INTRAVENOUS; SUBCUTANEOUS at 04:44

## 2025-03-20 RX ADMIN — Medication 100 MILLIGRAM(S): at 11:30

## 2025-03-20 RX ADMIN — Medication 1 TABLET(S): at 11:29

## 2025-03-20 RX ADMIN — Medication 10 MG/HR: at 04:44

## 2025-03-20 RX ADMIN — SODIUM CHLORIDE 75 MILLILITER(S): 9 INJECTION, SOLUTION INTRAVENOUS at 19:25

## 2025-03-20 RX ADMIN — NICOTINE POLACRILEX 1 PATCH: 4 GUM, CHEWING ORAL at 07:29

## 2025-03-20 NOTE — PROGRESS NOTE ADULT - NS ATTEND AMEND GEN_ALL_CORE FT
I edited the note. Patient seen and examined during the GI rounds, case discussed with the GI PA, plan communicated to the primary team, assessment, recommendations and plan as above.     Time-based billing (NON-critical care).   50 minutes spent on total encounter; more than 50% of the visit was spent counseling and / or coordinating care by the attending physician.  The necessity of the time spent during the encounter on this date of service was due to: Coordination of care.

## 2025-03-20 NOTE — PROGRESS NOTE ADULT - SUBJECTIVE AND OBJECTIVE BOX
LOUISE SHOLA  69y  Male      Patient is a 69y old  Male who presents with a chief complaint of GI bleeding.      INTERVAL HPI/OVERNIGHT EVENTS:  No overnight events       T(C): 36.8 (03-20-25 @ 11:00), Max: 36.8 (03-20-25 @ 11:00)  HR: 87 (03-20-25 @ 07:00) (78 - 144)  BP: 108/64 (03-20-25 @ 07:00) (96/58 - 130/73)  RR: 23 (03-20-25 @ 11:00) (16 - 32)  SpO2: 98% (03-20-25 @ 07:00) (96% - 100%)  Wt(kg): --Vital Signs Last 24 Hrs  T(C): 36.8 (20 Mar 2025 11:00), Max: 36.8 (20 Mar 2025 11:00)  T(F): 98.2 (20 Mar 2025 11:00), Max: 98.2 (20 Mar 2025 11:00)  HR: 87 (20 Mar 2025 07:00) (78 - 144)  BP: 108/64 (20 Mar 2025 07:00) (96/58 - 130/73)  BP(mean): 80 (20 Mar 2025 07:00) (72 - 96)  RR: 23 (20 Mar 2025 11:00) (16 - 32)  SpO2: 98% (20 Mar 2025 07:00) (96% - 100%)    Parameters below as of 20 Mar 2025 07:00  Patient On (Oxygen Delivery Method): room air        PHYSICAL EXAM:  GENERAL: NAD, well-groomed, well-developed  NECK: Supple, No JVD, Normal thyroid  NERVOUS SYSTEM:  Alert & Oriented X3, Motor Strength 5/5 B/L upper and lower extremities;   CHEST/LUNG: Clear to percussion bilaterally; No rales, rhonchi, wheezing, or rubs  HEART: Regular rate and rhythm; No murmurs, rubs, or gallops  ABDOMEN: Soft, Nontender, Nondistended; Bowel sounds present  EXTREMITIES:  2+ Peripheral Pulses, No clubbing, cyanosis, or edema    Labs reviewed   Imaging Reviewed     cefTRIAXone   IVPB 1000 milliGRAM(s) IV Intermittent every 24 hours  chlorhexidine 2% Cloths 1 Application(s) Topical <User Schedule>  dextrose 5% + sodium chloride 0.9%. 1000 milliLiter(s) IV Continuous <Continuous>  folic acid 1 milliGRAM(s) Oral daily  LORazepam   Injectable 2 milliGRAM(s) IV Push every 4 hours PRN  multivitamin 1 Tablet(s) Oral daily  nicotine -  14 mG/24Hr(s) Patch 1 Patch Transdermal daily  octreotide  Infusion 50 MICROgram(s)/Hr IV Continuous <Continuous>  pantoprazole Infusion 8 mG/Hr IV Continuous <Continuous>  thiamine 100 milliGRAM(s) Oral daily

## 2025-03-20 NOTE — PROGRESS NOTE ADULT - ATTENDING COMMENTS
patient seen and examined agree above note   PPI drip octreotide drip   follow h/h   thiamine folate   SDU

## 2025-03-20 NOTE — PROGRESS NOTE ADULT - ASSESSMENT
69 year old male with a  medical history of Hypertension, HLD, chronic  atrial fibrillation on Eliquis, daily alcohol use  comes to emergency room for syncope. patient states that over the last week has been having black stools and increasing weakness. patient  felt lightheaded and passed out and hit head.     syncope / GI bleed / esophageal varices s/p banding / acute blood loss anemia     - HGB from 6.3 -> 7.9 s/p transfusion of 3 units PRBC   - continue IV octreotide and Protonix   - hold Eliquis   - advance diet to clears today   - follow H/H   - ativan prn for elevated CIWA   -I discussed plan with medical resident   - 50 minutes total spent today on patient care

## 2025-03-20 NOTE — PROGRESS NOTE ADULT - ASSESSMENT
69 year old man with Afib, presents with dizziness found to be anemic      - NPO   - transfuse 2 units PRBC  - c/w octreotide and ppi ggt  - IV fluids   - Afib - monitor      69yMale pmh A-fib on eliquis last dose yesterday, active ETOH use, active cigarette smoking, presents for weakness. Patient reports melena intermittently for a week. patient takes advil as well 2-3 daily for knee pain. Currently Patient denies nausea, vomiting, hematemesis, diarrhea, constipation, abdominal pain. Patient has never had Colonoscopy before.  +melena.    #UGI bleed 2/2 varices   #intermittent melenic stools   #acute blood loss anemia   3/19/2025-rectal exam-melenic stool in rectal vault and on finger  ddx PUD, esophageal varices, erosive gastritis  s/p EGD 3/19/25 Dr. Blount  Impressions:  -Varices in the lower third of the esophagus. (Ligation).  -Congestion, petechiae and mosaic mucosal pattern inthe stomach.  -Protonix drip 72 hours total then can do PO BID  -Octreotide drip 3-5 days and then D/C  -resume Xeralto as indicated in the evening 24 hours after procedure - per Gastro   -CBC Q8 today  -Transfuse prn to hgb >7  -Alcohol and tobacco Cessation Strongly Advised and Encouraged   - For Vaccinations: Please f/u in clinic for being uptodate with HAV/HBV/Influenza/Pneumococcal vaccines.  - Lifestyle/Dietary modifications: Calorie intake 25-40 Kcal/kg/day; Protein intake: 1.2-1.5 gm/kg/day  - Avoid smoking and NSAIDs  -Colonoscopy as outpatient Follow up with our [facility] located at [address] [place] [address]. Phone Number: [phone]     #Cirrhotic liver  #altered LFTs  -Abdominal ultrasound AFP every 6 months for HCC screening  -EGD outpatient for esophageal varices screening   -Check Hepatitis B Sag, Hep B SAB, Hep A Ab, Hep C Ab,Smooth Muscle Antibody, AMA, THONY, Ceruloplasmin, Ferritin, Alpha-1-antitrypsin, CMV, Herpes serologies,   -Hepatology OP     #p AF, controlled rate  #HTN  - c/w metoprolol ER 50mg qd   - restart amlodipine 5mg QD  - restart xeralto if hb stable tmrw   - Chadsvasc - 2    #active ETOH use   - counselled on abstinence  - Start naltrexone per addiction med if elevated AST/ALT resolve   - c/w Thiamine and FA   -Withdrawals per CIWA    #Vocal cord nodule   - incidental finding during EGD   - ENT f/u OP vs IP     VTE ppx - SCD, restart xeralto tmrw if hb remains stable per GI   GI ppx - pantoprazole ggt  Activity as tolerated  Clear liquids  Full Code  SDU

## 2025-03-20 NOTE — PROGRESS NOTE ADULT - SUBJECTIVE AND OBJECTIVE BOX
Patient is a 69y old  Male who presents with a chief complaint of GI bleeding (19 Mar 2025 11:52)        Over Night Events:  s/p egd with banding of varices. comfortable now not on pressors.           PHYSICAL EXAM    ICU Vital Signs Last 24 Hrs  T(C): 36.4 (19 Mar 2025 23:00), Max: 36.4 (19 Mar 2025 19:00)  T(F): 97.5 (19 Mar 2025 23:00), Max: 97.6 (19 Mar 2025 19:00)  HR: 94 (20 Mar 2025 06:00) (78 - 144)  BP: 130/73 (20 Mar 2025 06:00) (96/58 - 131/80)  BP(mean): 96 (20 Mar 2025 06:00) (72 - 103)  ABP: --  ABP(mean): --  RR: 19 (20 Mar 2025 06:00) (16 - 32)  SpO2: 97% (20 Mar 2025 06:00) (96% - 100%)    O2 Parameters below as of 20 Mar 2025 06:00  Patient On (Oxygen Delivery Method): room air            CONSTITUTIONAL:  comfortable    ENT:   Airway patent,   Mouth with normal mucosa.   No thrush    EYES:   Pupils equal,   Round and reactive to light.    CARDIAC:   Normal rate,   Regular rhythm.    No edema      RESPIRATORY:   No wheezing  Bilateral BS  Normal chest expansion  Not tachypneic,  No use of accessory muscles    GASTROINTESTINAL:  Abdomen soft,   Non-tender,   No guarding,   + BS    MUSCULOSKELETAL:   Range of motion is not limited,  No clubbing, cyanosis    NEUROLOGICAL:   Alert and oriented   No motor  deficits.    SKIN:   Skin normal color for race,   Warm and dry and intact.   No evidence of rash.        03-19-25 @ 07:01  -  03-20-25 @ 07:00  --------------------------------------------------------  IN:    dextrose 5% + sodium chloride 0.9%: 1725 mL    IV PiggyBack: 50 mL    Octreotide: 230 mL    Pantoprazole: 230 mL    PRBCs (Packed Red Blood Cells): 300 mL  Total IN: 2535 mL    OUT:    Voided (mL): 1400 mL  Total OUT: 1400 mL    Total NET: 1135 mL          LABS:                            7.9    10.25 )-----------( 178      ( 20 Mar 2025 06:05 )             24.5                                               03-20    143  |  114[H]  |  19  ----------------------------<  100[H]  3.9   |  22  |  0.7    Ca    7.4[L]      20 Mar 2025 06:05  Phos  2.5     03-20  Mg     1.8     03-20    TPro  5.2[L]  /  Alb  2.7[L]  /  TBili  1.1  /  DBili  x   /  AST  98[H]  /  ALT  59[H]  /  AlkPhos  110  03-20      PT/INR - ( 20 Mar 2025 06:05 )   PT: 14.70 sec;   INR: 1.24 ratio         PTT - ( 18 Mar 2025 22:36 )  PTT:27.7 sec                                       Urinalysis Basic - ( 20 Mar 2025 06:05 )    Color: x / Appearance: x / SG: x / pH: x  Gluc: 100 mg/dL / Ketone: x  / Bili: x / Urobili: x   Blood: x / Protein: x / Nitrite: x   Leuk Esterase: x / RBC: x / WBC x   Sq Epi: x / Non Sq Epi: x / Bacteria: x                                                  LIVER FUNCTIONS - ( 20 Mar 2025 06:05 )  Alb: 2.7 g/dL / Pro: 5.2 g/dL / ALK PHOS: 110 U/L / ALT: 59 U/L / AST: 98 U/L / GGT: x                                                                                                                                       MEDICATIONS  (STANDING):  cefTRIAXone   IVPB 1000 milliGRAM(s) IV Intermittent every 24 hours  chlorhexidine 2% Cloths 1 Application(s) Topical <User Schedule>  dextrose 5% + sodium chloride 0.9%. 1000 milliLiter(s) (75 mL/Hr) IV Continuous <Continuous>  folic acid 1 milliGRAM(s) Oral daily  multivitamin 1 Tablet(s) Oral daily  nicotine -  14 mG/24Hr(s) Patch 1 Patch Transdermal daily  octreotide  Infusion 50 MICROgram(s)/Hr (10 mL/Hr) IV Continuous <Continuous>  pantoprazole Infusion 8 mG/Hr (10 mL/Hr) IV Continuous <Continuous>  thiamine 100 milliGRAM(s) Oral daily    MEDICATIONS  (PRN):  LORazepam   Injectable 2 milliGRAM(s) IV Push every 4 hours PRN CIWA 8-14      New X-rays reviewed:                                                                                  ECHO    CXR interpreted by me:

## 2025-03-20 NOTE — PROGRESS NOTE ADULT - ASSESSMENT
69yMale pmh A-fib on eliquis last dose yesterday, active ETOH use, active cigarette smoking, presents for weakness. Patient reports melena intermittently for a week. patient takes advil as well 2-3 daily for knee pain. Currently Patient denies nausea, vomiting, hematemesis, diarrhea, constipation, abdominal pain. Patient has never had Colonoscopy before.  +melena    Cirrhotic liver  #active ETOH use #altered LFTs  #intermittent melenic stools   #acute blood loss anemia   3/19/2025-rectal exam-melenic stool in rectal vault and on finger  ddx PUD, esophageal varices, erosive gastritis  s/p EGD 3/19/25 Dr. Blount  Impressions:  -Varices in the lower third of the esophagus. (Ligation).  -Vocal Cords: Nodules seen on vocal cords.  -Congestion, petechiae and mosaic mucosal pattern inthe stomach.  -Normal mucosa in the whole examined duodenum.  Rec  -ENT evaluation for vocal cord lesion.  -Protonix drip 72 hours total then can do PO BID  -Octreotide drip 3-5 days and then D/C  -resume Eliquis as indicated in the evening 24 hours after procedure   -Trend CBC and keep HGB above 7  -Avoid NSAIDs.  -Alcohol abstinence.  - Follow up with our [facility] located at [address] [place] [address]. Phone Number: [phone]   -Maintain Hemodynamic Stability  -MELD will likely be altered given eliquis and INR   -Monitor CBC  -CMP,Optimize Electrolytes  -PT,PTT,INR  -EKG, Chest-Xray   -Transfuse prn to hgb >7  -Two large bore IV lines  -Continue PPI   -Monitor Vital Signs  -Monitor Stool For blood, frequency, consistency, melena  -Active Type and Screen  -Iron Studies, Folate, Vitamin B12 levels   -avoid N-saids   -Hepatic encephalopathy-none  -Abdominal ultrasound AFP every 6 months for HCC screening  -EGD outpatient for esophageal varices screening   -Check Hepatitis B Sag, Hep B SAB, Hep A Ab, Hep C Ab,Smooth Muscle Antibody, AMA, THONY, Ceruloplasmin, Ferritin, Alpha-1-antitrypsin, CMV, Herpes serologies,   -Follow up with our [facility] located at [address] [place] [address]. Phone Number: [phone] for possible liver transplant referral  -Alcohol and tobacco Cessation Strongly Advised and Encouraged   - For Vaccinations: Please f/u in clinic for being uptodate with HAV/HBV/Influenza/Pneumococcal vaccines.  - Lifestyle/Dietary modifications: Calorie intake 25-40 Kcal/kg/day; Protein intake: 1.2-1.5 gm/kg/day  - Avoid smoking and NSAIDs  -Colonoscopy as outpatient Follow up with our [facility] located at [address] [place] [address]. Phone Number: [phone]   -Withdrawals per MAYITO   69 y.o M w/ hx of A-fib on eliquis last dose 3/18/25, active ETOH use, active cigarette smoking, presents for weakness, seen for variceal UGIB s/p EGD w/ banding on 3/19/25, being followed.   melena x 1 week. takes advil as well 2-3 daily for knee pain.     #Cirrhotic liver  #active ETOH use   #altered LFTs  #intermittent melenic stools   #acute blood loss anemia     3/19/2025-rectal exam-melenic stool in rectal vault and on finger  ddx PUD, esophageal varices, erosive gastritis    s/p EGD 3/19/25 Dr. Blount  Impressions:  -4 cords of large Varices in the lower third of the esophagus. (Ligation x 9 bands).  -Vocal Cords: Nodules seen on vocal cords.  -Congestion, petechiae and mosaic mucosal pattern inthe stomach.  -Normal mucosa in the whole examined duodenum.      Rec  -ENT evaluation for vocal cord lesion.  -Protonix drip x 72 hours total then can do PO BID x 8 weeks  -Octreotide drip x 3 days and then D/C  -once off octreotide, may place on coreg 3.125 q12h to target HR 55-60  -resume Eliquis as indicated in the evening 24 hours after procedure   -Trend CBC and transfuse PRN to keep HGB > 7  -Avoid NSAIDs.  -Alcohol abstinence.  -Follow up with our [facility] located at [address] [place] [address]. Phone Number: [phone]   -Maintain Hemodynamic Stability  -MELD will likely be altered given eliquis and INR   -Monitor CBC  -CMP,Optimize Electrolytes  -PT,PTT,INR  -EKG, Chest-Xray   -Transfuse prn to hgb >7  -Two large bore IV lines  -Monitor Vital Signs  -Monitor Stool For blood, frequency, consistency, melena  -Active Type and Screen  -Iron Studies, Folate, Vitamin B12 levels     -Hepatic encephalopathy-none  -HCC screening; no liver lesions on CT. OP f/u w/ hepatology for Abdominal ultrasound AFP every 6 months for HCC screening  -EGD outpatient for esophageal varices screening in 3 weeks for repeat banding  -Check Hepatitis B Sag, Hep B SAB, hep B core Ab, Hep A Ab, Hep C Ab  -Follow up with our [facility] located at [address] [place] [address]. Phone Number: [phone] for possible liver transplant referral  -Alcohol and tobacco Cessation Strongly Advised and Encouraged   -For Vaccinations: Please f/u in clinic for being uptodate with HAV/HBV/Influenza/Pneumococcal vaccines.  -Lifestyle/Dietary modifications: Calorie intake 25-40 Kcal/kg/day; Protein intake: 1.2-1.5 gm/kg/day  -Avoid smoking and NSAIDs  -Colonoscopy as outpatient Follow up with our [facility] located at [address] [place] [address]. Phone Number: [phone]   -Withdrawals per CIWA protocol  -we will follow closely

## 2025-03-20 NOTE — PROGRESS NOTE ADULT - ASSESSMENT
IMPRESSION:  gib  HO afib on eliquis  etoh abuse  cirrhosis  active smoker    PLAN:      CNS: CIWA PRN, continue thiamine/folate, addiction consult.     HEENT: Oral care    PULMONARY:  HOB @ 45 degrees.  Aspiration precautions, on ra cxr clear    CARDIOVASCULAR: avoid volume overload, MAP adequate, echo, DC fluids     GI: GI prophylaxis PPI drip, will fu with gi to transition to BID dosing, continue octreotide, s/p egd with variceal banding. start clear liquid diet today per gi.    RENAL:  Follow up lytes.  Correct as needed    INFECTIOUS DISEASE: SBP prophylaxis with ceftriaxone     HEMATOLOGICAL:  hold eliquis, start scd if non ambulatory. transfuse prn to keep hgb >7    ENDOCRINE:  Follow up FS.  Insulin protocol if needed.    MUSCULOSKELETAL: ambulate as tolerated    SDU, if repeat hgb stable floor in pm.

## 2025-03-20 NOTE — PROGRESS NOTE ADULT - SUBJECTIVE AND OBJECTIVE BOX
69yMale  Being followed for liver cirrhosis   Interval history: Patient denies nausea, vomiting, hematemesis, melena, blood in stool, diarrhea, constipation, abdominal pain. Patient tolerating clear liquids. Patient s/p EGD.      PAST MEDICAL & SURGICAL HISTORY:   Atrial fibrillation  Seizures  Collar bone fracture                Social History: +cigarette smoking. + alcohol. No illegal drug use.            MEDICATIONS  (STANDING):  cefTRIAXone   IVPB 1000 milliGRAM(s) IV Intermittent every 24 hours  chlorhexidine 2% Cloths 1 Application(s) Topical <User Schedule>  dextrose 5% + sodium chloride 0.9%. 1000 milliLiter(s) (75 mL/Hr) IV Continuous <Continuous>  folic acid 1 milliGRAM(s) Oral daily  multivitamin 1 Tablet(s) Oral daily  nicotine -  14 mG/24Hr(s) Patch 1 Patch Transdermal daily  octreotide  Infusion 50 MICROgram(s)/Hr (10 mL/Hr) IV Continuous <Continuous>  pantoprazole Infusion 8 mG/Hr (10 mL/Hr) IV Continuous <Continuous>  thiamine 100 milliGRAM(s) Oral daily    MEDICATIONS  (PRN):  LORazepam   Injectable 2 milliGRAM(s) IV Push every 4 hours PRN CIWA 8-14       Allergies:  No Known Allergies          REVIEW OF SYSTEMS:  General:  No weight loss, fevers, or chills.  Eyes:  No reported pain or visual changes  ENT:  No sore throat or runny nose.  NECK: No stiffness   CV:  No chest pain or palpitations.  Resp:  No shortness of breath, cough  GI:  No abdominal pain, nausea, vomiting, dysphagia, diarrhea or constipation. No rectal bleeding, melena, or hematemesis.  Neuro:  No tingling, numbness         VITAL SIGNS:   T(F): 97.4 (25 @ 07:05), Max: 97.6 (25 @ 19:00)  HR: 87 (25 @ 07:00) (78 - 144)  BP: 108/64 (25 @ 07:00) (96/58 - 130/73)  RR: 25 (25 @ 07:05) (16 - 32)  SpO2: 98% (25 @ 07:00) (96% - 100%)    PHYSICAL EXAM:  GENERAL: AAOx3, no acute distress.  HEAD:  Atraumatic, Normocephalic  EYES: conjunctiva and sclera clear  NECK: Supple, no thyromegaly  CHEST/LUNG: Clear to auscultation bilaterally; No wheeze, rhonchi, or rales  HEART: Regular rate and rhythm; normal S1, S2, No murmurs.  ABDOMEN: Soft, nontender, nondistended; Bowel sounds present  NEUROLOGY: No asterixis or tremor.   SKIN: Intact, no jaundice            LABS:                        7.9    10.25 )-----------( 178      ( 20 Mar 2025 06:05 )             24.5     03-20    143  |  114[H]  |  19  ----------------------------<  100[H]  3.9   |  22  |  0.7    Ca    7.4[L]      20 Mar 2025 06:05  Phos  2.5     03-20  Mg     1.8     -20    TPro  5.2[L]  /  Alb  2.7[L]  /  TBili  1.1  /  DBili  x   /  AST  98[H]  /  ALT  59[H]  /  AlkPhos  110  03-20    LIVER FUNCTIONS - ( 20 Mar 2025 06:05 )  Alb: 2.7 g/dL / Pro: 5.2 g/dL / ALK PHOS: 110 U/L / ALT: 59 U/L / AST: 98 U/L / GGT: x           PT/INR - ( 20 Mar 2025 06:05 )   PT: 14.70 sec;   INR: 1.24 ratio         PTT - ( 18 Mar 2025 22:36 )  PTT:27.7 sec    IMAGING:    < from: CT Abdomen and Pelvis w/ IV Cont (25 @ 22:43) >    ACC: [id number] EXAM:  CT ABDOMEN AND PELVIS IC   ORDERED BY: MADIHA WATTS     ACC: [id number] EXAM:  CT CHEST IC   ORDERED BY: MADIHA WATTS     PROCEDURE DATE:  2025          INTERPRETATION:  STUDY INDICATION: trauma    TECHNIQUE: CT of the chest, abdomen and pelvis was performed following   administration of IV contrast. Oral contrast was not administered.    Reformatted images in the coronal and sagittal planes were acquired.  CONTRAST VOLUME: Omnipaque 350 (accession [id number]), NONE (accession   [id number]). 90 cc administered. 10 cc discarded.    COMPARISON CT: None.    QUALITY STATEMENT: Patient upper extremities causing streak artifact   inherently degrades image quality and limits this exam.    FINDINGS:    CHEST    MEDIASTINUM/LYMPH NODES: No lymphadenopathy by size criteria..    HEART/GREAT VESSELS: Left atrial enlargement. No pericardial effusion.   Normal caliber aorta.  Multivessel coronary arterial calcifications.   Aortic valvular calcifications.    LUNGS, PLEURA,AND AIRWAYS: Central airways are patent. No mass or   consolidation. No pneumothorax or pleural effusion.    ABDOMEN/PELVIS    HEPATOBILIARY: Cirrhotic. Cholelithiasis..    SPLEEN: Unremarkable.    PANCREAS: Unremarkable.    ADRENAL GLANDS: Unremarkable.    KIDNEYS: Unremarkable.    ABDOMINOPELVIC NODES: Unremarkable.    PELVIC ORGANS: Unremarkable.    PERITONEUM/MESENTERY/BOWEL: No bowel obstruction, pneumoperitoneum or   pneumatosis. Small ascites.  Normal appendix.    BONES/SOFT TISSUES: No acute osseous abnormality.    OTHER/VASCULAR: Normal caliber aorta.      IMPRESSION:    No evidence of an acute traumatic solid organ or osseous injury.    Cirrhotic liver. Small ascites.    --- End of Report ---            LIZZY BRADY MD; Attending Radiologist  This document has been electronically signed. Mar 19 2025 12:14AM    < end of copied text >    < from: EGD (25 @ 15:30) >  EGD Report    Date: 3/19/2025 3:30 PM    Patient Name: SHOLA GIL    MRN: [id number]    Account Number:    [id number]    Gender: Male     (age): 1955 (69)    Instrument(s):    [id number]-[id number] ([id number])([id number])    Attending/Fellow:    Mine Prieto MD        Referring Physician:    ED PHYSICIAN UNASSIGNED        Nurse(s):    Bev Owusu    History of Present Illness:    H&P was previously reviewed.    Administered Medications:    As per Anesthesiology Record    Indications:    Gastrointestinal hemorrhage: 578.9 - K92.2    Procedure:    The procedure, indications, preparation and potential complications were  explained to the patient, who indicated understanding and signed the  corresponding consent forms. MAC was administered by anesthesiologist.  Continuous pulse oximetry and blood pressure monitoring were used throughout the  procedure. Supplemental oxygen was used. Patient was placed in the left lateral  decubitus position. The endoscope was introduced through the mouth and advanced  under direct visualization until the second part of the duodenum was reached.  Patient tolerance to the procedure was good. The procedure was not difficult.  Blood loss was minimal.    Limitations/Complications:    There were no apparent limitations or complications    Findings:    Esophagus Protruding lesions 4 cords of grade IV varices were seen in the lower  third of the esophagus. There were stigmata of recent bleeding.9 bands were  applied for hemostasis successfully.    Additional esophagus findings Vocal Cords: Nodules seen on vocal cords.    Stomach Mucosa Diffuse congestion, petechiae and mosaic mucosal pattern of the  mucosa was noted in the stomach. These findings were suggestive of portal  hypertensive gastropathy.    Duodenum Mucosa Normal mucosa was noted in the whole examined duodenum.    Impressions:    Varices in the lower third of the esophagus. (Ligation).    Vocal Cords: Nodules seen on vocal cords.    Congestion, petechiae and mosaic mucosal pattern inthe stomach.    Normal mucosa in the whole examined duodenum.    Plan:    ENT evaluation for vocal cord lesion.    Protonix drip.    Octreotide drip.    Trend CBC and keep HGB above 7    Avoid NSAIDs.    Alcohol abstinence.    Follow up in office after discharge.    Keep NPO.        Attending Participation:    I was present and participated during the entire procedure, including non-key  portions.    Mine Prieto MD    Version 1, Electronically signed on 3/20/2025 8:39:30 AM by Mine Prieto MD    < end of copied text >       Initial (On Arrival)

## 2025-03-20 NOTE — PROGRESS NOTE ADULT - SUBJECTIVE AND OBJECTIVE BOX
Pt interviewed, examined and EMR chart reviewed.    Follow up of Alcohol Dependency. Pt is on Ativan protocol. Pt is doing better. Pt with no complaint of withdrawal      REVIEW OF SYSTEMS:    Constitutional: No fever, weight loss or fatigue  ENT:  No difficulty hearing, tinnitus, vertigo; No sinus or throat pain  Neck: No pain or stiffness  Respiratory: No cough, wheezing, chills or hemoptysis  Cardiovascular: No chest pain, palpitations, shortness of breath, dizziness or leg swelling  Gastrointestinal: No abdominal or epigastric pain. No nausea, vomiting or hematemesis; No diarrhea or constipation. No melena or hematochezia.  Neurological: No headaches, memory loss, loss of strength, numbness or tremors  Musculoskeletal: No joint pain or swelling; No muscle, back or extremity pain      MEDICATIONS  (STANDING):  cefTRIAXone   IVPB 1000 milliGRAM(s) IV Intermittent every 24 hours  chlorhexidine 2% Cloths 1 Application(s) Topical <User Schedule>  dextrose 5% + sodium chloride 0.9%. 1000 milliLiter(s) (75 mL/Hr) IV Continuous <Continuous>  folic acid 1 milliGRAM(s) Oral daily  multivitamin 1 Tablet(s) Oral daily  nicotine -  14 mG/24Hr(s) Patch 1 Patch Transdermal daily  octreotide  Infusion 50 MICROgram(s)/Hr (10 mL/Hr) IV Continuous <Continuous>  pantoprazole Infusion 8 mG/Hr (10 mL/Hr) IV Continuous <Continuous>  thiamine 100 milliGRAM(s) Oral daily    MEDICATIONS  (PRN):  LORazepam   Injectable 2 milliGRAM(s) IV Push every 4 hours PRN CIWA 8-14      Vital Signs Last 24 Hrs  T(C): 36.3 (20 Mar 2025 07:05), Max: 36.4 (19 Mar 2025 19:00)  T(F): 97.4 (20 Mar 2025 07:05), Max: 97.6 (19 Mar 2025 19:00)  HR: 87 (20 Mar 2025 07:00) (78 - 144)  BP: 108/64 (20 Mar 2025 07:00) (96/58 - 130/73)  BP(mean): 80 (20 Mar 2025 07:00) (72 - 96)  RR: 25 (20 Mar 2025 07:05) (16 - 32)  SpO2: 98% (20 Mar 2025 07:00) (96% - 100%)    Parameters below as of 20 Mar 2025 07:00  Patient On (Oxygen Delivery Method): room air        PHYSICAL EXAM:    Constitutional: NAD, well-groomed, well-developed  HEENT: PERRLA, EOMI, Normal Hearing,   Neck: No LAD, No JVD  Back: Normal spine flexure, No CVA tenderness  Respiratory: CTAB/L  Cardiovascular: S1 and S2, RRR, no M/G/R  Gastrointestinal: BS+, soft, NT/ND  Extremities: No peripheral edema  Neurological: A/O x 3, no focal deficits    LABS:                        7.9    10.25 )-----------( 178      ( 20 Mar 2025 06:05 )             24.5     03-20    143  |  114[H]  |  19  ----------------------------<  100[H]  3.9   |  22  |  0.7    Ca    7.4[L]      20 Mar 2025 06:05  Phos  2.5     03-20  Mg     1.8     03-20    TPro  5.2[L]  /  Alb  2.7[L]  /  TBili  1.1  /  DBili  x   /  AST  98[H]  /  ALT  59[H]  /  AlkPhos  110  03-20    PT/INR - ( 20 Mar 2025 06:05 )   PT: 14.70 sec;   INR: 1.24 ratio         PTT - ( 18 Mar 2025 22:36 )  PTT:27.7 sec  Urinalysis Basic - ( 20 Mar 2025 06:05 )    Color: x / Appearance: x / SG: x / pH: x  Gluc: 100 mg/dL / Ketone: x  / Bili: x / Urobili: x   Blood: x / Protein: x / Nitrite: x   Leuk Esterase: x / RBC: x / WBC x   Sq Epi: x / Non Sq Epi: x / Bacteria: x      Drug Screen Urine:  Alcohol Level        RADIOLOGY & ADDITIONAL STUDIES:

## 2025-03-20 NOTE — PROGRESS NOTE ADULT - SUBJECTIVE AND OBJECTIVE BOX
Patient seen and evaluated this am, feeling improved, no complaints       T(F): 98.2 (03-20-25 @ 11:00), Max: 98.2 (03-20-25 @ 11:00)  HR: 87 (03-20-25 @ 07:00)  BP: 108/64 (03-20-25 @ 07:00)  RR: 20  SpO2: 98% (03-20-25 @ 07:00) (96% - 100%)    PHYSICAL EXAM:  GENERAL: NAD  HEAD:  Atraumatic, Normocephalic  EYES: EOMI, PERRLA, conjunctiva and sclera clear  NERVOUS SYSTEM:  Alert & Oriented X3, no focal deficits   CHEST/LUNG: Clear to percussion bilaterally; No rales, rhonchi, wheezing, or rubs  HEART: Regular rate and rhythm; No murmurs, rubs, or gallops  ABDOMEN: Soft, Nontender, Nondistended; Bowel sounds present  EXTREMITIES:  2+ Peripheral Pulses, No clubbing, cyanosis, or edema    LABS  03-20    143  |  114[H]  |  19  ----------------------------<  100[H]  3.9   |  22  |  0.7    Ca    7.4[L]      20 Mar 2025 06:05  Phos  2.5     03-20  Mg     1.8     03-20    TPro  5.2[L]  /  Alb  2.7[L]  /  TBili  1.1  /  DBili  x   /  AST  98[H]  /  ALT  59[H]  /  AlkPhos  110  03-20                          7.9    10.25 )-----------( 178      ( 20 Mar 2025 06:05 )             24.5     PT/INR - ( 20 Mar 2025 06:05 )   PT: 14.70 sec;   INR: 1.24 ratio         PTT - ( 18 Mar 2025 22:36 )  PTT:27.7 sec    EGD:    Findings:    Esophagus Protruding lesions 4 cords of grade IV varices were seen in the lower  third of the esophagus. There were stigmata of recent bleeding.9 bands were  applied for hemostasis successfully.    Additional esophagus findings Vocal Cords: Nodules seen on vocal cords.    Stomach Mucosa Diffuse congestion, petechiae and mosaic mucosal pattern of the  mucosa was noted in the stomach. These findings were suggestive of portal  hypertensive gastropathy.    Duodenum Mucosa Normal mucosa was noted in the whole examined duodenum.    Impressions:    Varices in the lower third of the esophagus. (Ligation).    Vocal Cords: Nodules seen on vocal cords.    Congestion, petechiae and mosaic mucosal pattern inthe stomach.    Normal mucosa in the whole examined duodenum.      RADIOLOGY  < from: CT Abdomen and Pelvis w/ IV Cont (03.18.25 @ 22:43) >  IMPRESSION:    No evidence of an acute traumatic solid organ or osseous injury.    Cirrhotic liver. Small ascites.    < end of copied text >    MEDICATIONS  (STANDING):  cefTRIAXone   IVPB 1000 milliGRAM(s) IV Intermittent every 24 hours  chlorhexidine 2% Cloths 1 Application(s) Topical <User Schedule>  dextrose 5% + sodium chloride 0.9%. 1000 milliLiter(s) (75 mL/Hr) IV Continuous <Continuous>  folic acid 1 milliGRAM(s) Oral daily  multivitamin 1 Tablet(s) Oral daily  nicotine -  14 mG/24Hr(s) Patch 1 Patch Transdermal daily  octreotide  Infusion 50 MICROgram(s)/Hr (10 mL/Hr) IV Continuous <Continuous>  pantoprazole Infusion 8 mG/Hr (10 mL/Hr) IV Continuous <Continuous>  thiamine 100 milliGRAM(s) Oral daily    MEDICATIONS  (PRN):  LORazepam   Injectable 2 milliGRAM(s) IV Push every 4 hours PRN CIWA 8-14

## 2025-03-21 ENCOUNTER — RESULT REVIEW (OUTPATIENT)
Age: 70
End: 2025-03-21

## 2025-03-21 LAB
ALBUMIN SERPL ELPH-MCNC: 2.6 G/DL — LOW (ref 3.5–5.2)
ALP SERPL-CCNC: 108 U/L — SIGNIFICANT CHANGE UP (ref 30–115)
ALT FLD-CCNC: 54 U/L — HIGH (ref 0–41)
ANION GAP SERPL CALC-SCNC: 7 MMOL/L — SIGNIFICANT CHANGE UP (ref 7–14)
AST SERPL-CCNC: 89 U/L — HIGH (ref 0–41)
BASOPHILS # BLD AUTO: 0.05 K/UL — SIGNIFICANT CHANGE UP (ref 0–0.2)
BASOPHILS NFR BLD AUTO: 0.7 % — SIGNIFICANT CHANGE UP (ref 0–1)
BILIRUB SERPL-MCNC: 1 MG/DL — SIGNIFICANT CHANGE UP (ref 0.2–1.2)
BLD GP AB SCN SERPL QL: SIGNIFICANT CHANGE UP
BUN SERPL-MCNC: 12 MG/DL — SIGNIFICANT CHANGE UP (ref 10–20)
CALCIUM SERPL-MCNC: 7.1 MG/DL — LOW (ref 8.4–10.5)
CHLORIDE SERPL-SCNC: 113 MMOL/L — HIGH (ref 98–110)
CO2 SERPL-SCNC: 24 MMOL/L — SIGNIFICANT CHANGE UP (ref 17–32)
CREAT SERPL-MCNC: 0.8 MG/DL — SIGNIFICANT CHANGE UP (ref 0.7–1.5)
EGFR: 97 ML/MIN/1.73M2 — SIGNIFICANT CHANGE UP
EGFR: 97 ML/MIN/1.73M2 — SIGNIFICANT CHANGE UP
EOSINOPHIL # BLD AUTO: 0.24 K/UL — SIGNIFICANT CHANGE UP (ref 0–0.7)
EOSINOPHIL NFR BLD AUTO: 3.2 % — SIGNIFICANT CHANGE UP (ref 0–8)
GLUCOSE SERPL-MCNC: 105 MG/DL — HIGH (ref 70–99)
HAV IGM SER-ACNC: SIGNIFICANT CHANGE UP
HBV CORE IGM SER-ACNC: SIGNIFICANT CHANGE UP
HBV SURFACE AG SER-ACNC: SIGNIFICANT CHANGE UP
HCT VFR BLD CALC: 23 % — LOW (ref 42–52)
HCT VFR BLD CALC: 23.6 % — LOW (ref 42–52)
HCV AB S/CO SERPL IA: 13.06 S/CO — HIGH (ref 0–0.79)
HCV AB SERPL-IMP: REACTIVE
HGB BLD-MCNC: 7.3 G/DL — LOW (ref 14–18)
HGB BLD-MCNC: 7.5 G/DL — LOW (ref 14–18)
IMM GRANULOCYTES NFR BLD AUTO: 0.3 % — SIGNIFICANT CHANGE UP (ref 0.1–0.3)
INR BLD: 1.31 RATIO — HIGH (ref 0.65–1.3)
LYMPHOCYTES # BLD AUTO: 1.63 K/UL — SIGNIFICANT CHANGE UP (ref 1.2–3.4)
LYMPHOCYTES # BLD AUTO: 21.7 % — SIGNIFICANT CHANGE UP (ref 20.5–51.1)
MAGNESIUM SERPL-MCNC: 1.7 MG/DL — LOW (ref 1.8–2.4)
MCHC RBC-ENTMCNC: 27.8 PG — SIGNIFICANT CHANGE UP (ref 27–31)
MCHC RBC-ENTMCNC: 28.1 PG — SIGNIFICANT CHANGE UP (ref 27–31)
MCHC RBC-ENTMCNC: 31.7 G/DL — LOW (ref 32–37)
MCHC RBC-ENTMCNC: 31.8 G/DL — LOW (ref 32–37)
MCV RBC AUTO: 87.5 FL — SIGNIFICANT CHANGE UP (ref 80–94)
MCV RBC AUTO: 88.4 FL — SIGNIFICANT CHANGE UP (ref 80–94)
MONOCYTES # BLD AUTO: 0.84 K/UL — HIGH (ref 0.1–0.6)
MONOCYTES NFR BLD AUTO: 11.2 % — HIGH (ref 1.7–9.3)
NEUTROPHILS # BLD AUTO: 4.74 K/UL — SIGNIFICANT CHANGE UP (ref 1.4–6.5)
NEUTROPHILS NFR BLD AUTO: 62.9 % — SIGNIFICANT CHANGE UP (ref 42.2–75.2)
NRBC BLD AUTO-RTO: 0 /100 WBCS — SIGNIFICANT CHANGE UP (ref 0–0)
NRBC BLD AUTO-RTO: 0 /100 WBCS — SIGNIFICANT CHANGE UP (ref 0–0)
PLATELET # BLD AUTO: 135 K/UL — SIGNIFICANT CHANGE UP (ref 130–400)
PLATELET # BLD AUTO: 145 K/UL — SIGNIFICANT CHANGE UP (ref 130–400)
PMV BLD: 12.3 FL — HIGH (ref 7.4–10.4)
PMV BLD: 12.3 FL — HIGH (ref 7.4–10.4)
POTASSIUM SERPL-MCNC: 3.4 MMOL/L — LOW (ref 3.5–5)
POTASSIUM SERPL-SCNC: 3.4 MMOL/L — LOW (ref 3.5–5)
PROT SERPL-MCNC: 5.1 G/DL — LOW (ref 6–8)
PROTHROM AB SERPL-ACNC: 15.6 SEC — HIGH (ref 9.95–12.87)
RBC # BLD: 2.63 M/UL — LOW (ref 4.7–6.1)
RBC # BLD: 2.67 M/UL — LOW (ref 4.7–6.1)
RBC # FLD: 17.7 % — HIGH (ref 11.5–14.5)
RBC # FLD: 17.7 % — HIGH (ref 11.5–14.5)
SODIUM SERPL-SCNC: 144 MMOL/L — SIGNIFICANT CHANGE UP (ref 135–146)
WBC # BLD: 7.52 K/UL — SIGNIFICANT CHANGE UP (ref 4.8–10.8)
WBC # BLD: 8.06 K/UL — SIGNIFICANT CHANGE UP (ref 4.8–10.8)
WBC # FLD AUTO: 7.52 K/UL — SIGNIFICANT CHANGE UP (ref 4.8–10.8)
WBC # FLD AUTO: 8.06 K/UL — SIGNIFICANT CHANGE UP (ref 4.8–10.8)

## 2025-03-21 PROCEDURE — 99233 SBSQ HOSP IP/OBS HIGH 50: CPT

## 2025-03-21 PROCEDURE — 93307 TTE W/O DOPPLER COMPLETE: CPT | Mod: 26

## 2025-03-21 PROCEDURE — 93010 ELECTROCARDIOGRAM REPORT: CPT

## 2025-03-21 RX ORDER — CARVEDILOL 3.12 MG/1
3.12 TABLET, FILM COATED ORAL EVERY 12 HOURS
Refills: 0 | Status: DISCONTINUED | OUTPATIENT
Start: 2025-03-21 | End: 2025-03-23

## 2025-03-21 RX ORDER — AMLODIPINE BESYLATE 10 MG/1
5 TABLET ORAL DAILY
Refills: 0 | Status: DISCONTINUED | OUTPATIENT
Start: 2025-03-21 | End: 2025-03-21

## 2025-03-21 RX ORDER — ENOXAPARIN SODIUM 100 MG/ML
100 INJECTION SUBCUTANEOUS EVERY 12 HOURS
Refills: 0 | Status: DISCONTINUED | OUTPATIENT
Start: 2025-03-21 | End: 2025-03-23

## 2025-03-21 RX ORDER — METOPROLOL SUCCINATE 50 MG/1
25 TABLET, EXTENDED RELEASE ORAL EVERY 12 HOURS
Refills: 0 | Status: DISCONTINUED | OUTPATIENT
Start: 2025-03-21 | End: 2025-03-21

## 2025-03-21 RX ORDER — IRON SUCROSE 20 MG/ML
200 INJECTION, SOLUTION INTRAVENOUS EVERY 24 HOURS
Refills: 0 | Status: DISCONTINUED | OUTPATIENT
Start: 2025-03-21 | End: 2025-03-23

## 2025-03-21 RX ORDER — OCTREOTIDE ACETATE 500 UG/ML
50 INJECTION, SOLUTION INTRAVENOUS; SUBCUTANEOUS
Qty: 500 | Refills: 0 | Status: DISCONTINUED | OUTPATIENT
Start: 2025-03-21 | End: 2025-03-22

## 2025-03-21 RX ORDER — MAGNESIUM SULFATE 500 MG/ML
2 SYRINGE (ML) INJECTION ONCE
Refills: 0 | Status: COMPLETED | OUTPATIENT
Start: 2025-03-21 | End: 2025-03-21

## 2025-03-21 RX ADMIN — OCTREOTIDE ACETATE 10 MICROGRAM(S)/HR: 500 INJECTION, SOLUTION INTRAVENOUS; SUBCUTANEOUS at 02:00

## 2025-03-21 RX ADMIN — METOPROLOL SUCCINATE 25 MILLIGRAM(S): 50 TABLET, EXTENDED RELEASE ORAL at 11:43

## 2025-03-21 RX ADMIN — Medication 25 GRAM(S): at 10:52

## 2025-03-21 RX ADMIN — IRON SUCROSE 100 MILLIGRAM(S): 20 INJECTION, SOLUTION INTRAVENOUS at 13:02

## 2025-03-21 RX ADMIN — OCTREOTIDE ACETATE 10 MICROGRAM(S)/HR: 500 INJECTION, SOLUTION INTRAVENOUS; SUBCUTANEOUS at 11:53

## 2025-03-21 RX ADMIN — Medication 10 MG/HR: at 12:23

## 2025-03-21 RX ADMIN — Medication 10 MG/HR: at 16:37

## 2025-03-21 RX ADMIN — Medication 10 MG/HR: at 02:00

## 2025-03-21 RX ADMIN — Medication 1 APPLICATION(S): at 05:41

## 2025-03-21 RX ADMIN — Medication 50 MILLIEQUIVALENT(S): at 09:07

## 2025-03-21 RX ADMIN — CEFTRIAXONE 100 MILLIGRAM(S): 500 INJECTION, POWDER, FOR SOLUTION INTRAMUSCULAR; INTRAVENOUS at 23:06

## 2025-03-21 RX ADMIN — Medication 1 TABLET(S): at 11:43

## 2025-03-21 RX ADMIN — SODIUM CHLORIDE 75 MILLILITER(S): 9 INJECTION, SOLUTION INTRAVENOUS at 02:00

## 2025-03-21 RX ADMIN — CARVEDILOL 3.12 MILLIGRAM(S): 3.12 TABLET, FILM COATED ORAL at 17:15

## 2025-03-21 RX ADMIN — FOLIC ACID 1 MILLIGRAM(S): 1 TABLET ORAL at 11:43

## 2025-03-21 RX ADMIN — Medication 100 MILLIGRAM(S): at 11:43

## 2025-03-21 RX ADMIN — OCTREOTIDE ACETATE 10 MICROGRAM(S)/HR: 500 INJECTION, SOLUTION INTRAVENOUS; SUBCUTANEOUS at 16:37

## 2025-03-21 NOTE — PROGRESS NOTE ADULT - ASSESSMENT
69yMale pmh A-fib on eliquis last dose yesterday, active ETOH use, active cigarette smoking, presents for weakness. Patient reports melena intermittently for a week. patient takes advil as well 2-3 daily for knee pain. Currently Patient denies nausea, vomiting, hematemesis, diarrhea, constipation, abdominal pain. Patient has never had Colonoscopy before.  +melena.    #UGI bleed 2/2 varices   #intermittent melenic stools   #acute blood loss anemia   3/19/2025-rectal exam-melenic stool in rectal vault and on finger  ddx PUD, esophageal varices, erosive gastritis  s/p EGD 3/19/25 Dr. Blount  Impressions:  -Varices in the lower third of the esophagus. (Ligation).  -Congestion, petechiae and mosaic mucosal pattern inthe stomach.  -Protonix drip 72 hours total then can do PO BID  -Octreotide drip 3-5 days and then D/C  -resume Xeralto as indicated in the evening 24 hours after procedure - per Gastro   -CBC Q8 today  -Transfuse prn to hgb >7  -Alcohol and tobacco Cessation Strongly Advised and Encouraged   - For Vaccinations: Please f/u in clinic for being uptodate with HAV/HBV/Influenza/Pneumococcal vaccines.  - Lifestyle/Dietary modifications: Calorie intake 25-40 Kcal/kg/day; Protein intake: 1.2-1.5 gm/kg/day  - Avoid smoking and NSAIDs  -Colonoscopy as outpatient Follow up with our [facility] located at [address] [place] [address]. Phone Number: [phone]     #Cirrhotic liver  #altered LFTs  -Abdominal ultrasound AFP every 6 months for HCC screening  -EGD outpatient for esophageal varices screening   -Check Hepatitis B Sag, Hep B SAB, Hep A Ab, Hep C Ab,Smooth Muscle Antibody, AMA, THONY, Ceruloplasmin, Ferritin, Alpha-1-antitrypsin, CMV, Herpes serologies,   -Hepatology OP     #p AF, controlled rate  # NSVT - 12sec - single episode   #HTN  - restart metoprolol 25mg bid ---> ER 50mg at DC  - restart amlodipine 5mg QD  - restart xeralto if hb stable in the dvae  - Chadsvasc - 2  - TTE     #active ETOH use   - counselled on abstinence  - Start naltrexone per addiction med if elevated AST/ALT resolve   - c/w Thiamine and FA   -Withdrawals per CIWA    #Vocal cord nodule   - incidental finding during EGD   - ENT f/u OP vs IP     VTE ppx - SCD, restart xeralto tmrw if hb remains stable per GI   GI ppx - pantoprazole ggt  Activity as tolerated  Clear liquids  Full Code  SDU   69yMale pmh A-fib on eliquis last dose yesterday, active ETOH use, active cigarette smoking, presents for weakness. Patient reports melena intermittently for a week. patient takes advil as well 2-3 daily for knee pain. Currently Patient denies nausea, vomiting, hematemesis, diarrhea, constipation, abdominal pain. Patient has never had Colonoscopy before.  +melena.    #UGI bleed 2/2 varices   #intermittent melenic stools   #acute blood loss anemia   3/19/2025-rectal exam-melenic stool in rectal vault and on finger  ddx PUD, esophageal varices, erosive gastritis  s/p EGD 3/19/25 Dr. Blount  Impressions:  -Varices in the lower third of the esophagus. (Ligation).  -Congestion, petechiae and mosaic mucosal pattern inthe stomach.  -Protonix drip 72 hours total then can do PO BID  -Octreotide drip 3 days and then D/C  -resume Xeralto as indicated in the evening 24 hours after procedure - per Gastro   -Alcohol and tobacco Cessation Strongly Advised and Encouraged   - For Vaccinations: Please f/u in clinic for being uptodate with HAV/HBV/Influenza/Pneumococcal vaccines.  - Lifestyle/Dietary modifications: Calorie intake 25-40 Kcal/kg/day; Protein intake: 1.2-1.5 gm/kg/day  - Avoid smoking and NSAIDs  -Colonoscopy as outpatient Follow up with our [facility] located at [address] [place] [address]. Phone Number: [phone]   -HCC screening; no liver lesions on CT. OP f/u w/ hepatology for Abdominal ultrasound AFP every 6 months for HCC screening  -EGD outpatient for esophageal varices screening in 3 weeks for repeat banding      #Cirrhotic liver  #altered LFTs  -Abdominal ultrasound AFP every 6 months for HCC screening  -EGD outpatient for esophageal varices screening   -Check Hepatitis B Sag, Hep B SAB, Hep A Ab, Hep C Ab,Smooth Muscle Antibody, AMA, THONY, Ceruloplasmin, Ferritin, Alpha-1-antitrypsin, CMV, Herpes serologies,   -Hepatology OP     #p AF, controlled rate  # NSVT - 12sec - single episode   #HTN  - restart metoprolol 25mg bid ---> ER 50mg at DC  - restart amlodipine 5mg QD  - restart xeralto if hb stable in the dave  - Chadsvasc - 2  - TTE     #active ETOH use   - counselled on abstinence  - Start naltrexone per addiction med if elevated AST/ALT resolve   - c/w Thiamine and FA   -Withdrawals per CIWA    #Vocal cord nodule   - incidental finding during EGD   - ENT f/u OP vs IP     VTE ppx - SCD, restart xeralto tmrw if hb remains stable per GI   GI ppx - pantoprazole ggt  Activity as tolerated  Clear liquids  Full Code  SDU   69yMale pmh A-fib on eliquis last dose yesterday, active ETOH use, active cigarette smoking, presents for weakness. Patient reports melena intermittently for a week. patient takes advil as well 2-3 daily for knee pain. Currently Patient denies nausea, vomiting, hematemesis, diarrhea, constipation, abdominal pain. Patient has never had Colonoscopy before.  +melena.    #UGI bleed 2/2 varices   #intermittent melenic stools   #acute blood loss anemia   3/19/2025-rectal exam-melenic stool in rectal vault and on finger  ddx PUD, esophageal varices, erosive gastritis  s/p EGD 3/19/25 Dr. Blount  Impressions:  -Varices in the lower third of the esophagus. (Ligation).  -Congestion, petechiae and mosaic mucosal pattern inthe stomach.  -Protonix drip 72 hours total then can do PO BID  -Octreotide drip 3 days and then D/C  -resume Xeralto as indicated in the evening 24 hours after procedure - per Gastro   -Alcohol and tobacco Cessation Strongly Advised and Encouraged   - For Vaccinations: Please f/u in clinic for being uptodate with HAV/HBV/Influenza/Pneumococcal vaccines.  - Lifestyle/Dietary modifications: Calorie intake 25-40 Kcal/kg/day; Protein intake: 1.2-1.5 gm/kg/day  - Avoid smoking and NSAIDs  -Colonoscopy as outpatient Follow up with our [facility] located at [address] [place] [address]. Phone Number: [phone]   -HCC screening; no liver lesions on CT. OP f/u w/ hepatology for Abdominal ultrasound AFP every 6 months for HCC screening  -EGD outpatient for esophageal varices screening in 3 weeks for repeat banding      #Cirrhotic liver  #altered LFTs  -Abdominal ultrasound AFP every 6 months for HCC screening  -EGD outpatient for esophageal varices screening   -Check Hepatitis B Sag, Hep B SAB, Hep A Ab, Hep C Ab,Smooth Muscle Antibody, AMA, THONY, Ceruloplasmin, Ferritin, Alpha-1-antitrypsin, CMV, Herpes serologies,   -Hepatology OP     #p AF, controlled rate  # NSVT - 12sec - single episode   #HTN  - STOP amlodipine and metoprolol  - START Coreg 3.125mg BID    - restart xeralto if hb stable in the dave  - Chadsvasc - 2  - TTE - noted     #active ETOH use   - counselled on abstinence  - Start naltrexone per addiction med if elevated AST/ALT resolve   - c/w Thiamine and FA   -Withdrawals per CIWA    #Vocal cord nodule   - incidental finding during EGD   - ENT f/u OP vs IP     VTE ppx - SCD, restart xeralto tmrw if hb remains stable per GI   GI ppx - pantoprazole ggt  Activity as tolerated  Clear liquids  Full Code  SDU

## 2025-03-21 NOTE — DIETITIAN INITIAL EVALUATION ADULT - PERTINENT MEDS FT
MEDICATIONS  (STANDING):  carvedilol 3.125 milliGRAM(s) Oral every 12 hours  cefTRIAXone   IVPB 1000 milliGRAM(s) IV Intermittent every 24 hours  chlorhexidine 2% Cloths 1 Application(s) Topical <User Schedule>  folic acid 1 milliGRAM(s) Oral daily  iron sucrose IVPB 200 milliGRAM(s) IV Intermittent every 24 hours  multivitamin 1 Tablet(s) Oral daily  nicotine -  14 mG/24Hr(s) Patch 1 Patch Transdermal daily  octreotide  Infusion 50 MICROgram(s)/Hr (10 mL/Hr) IV Continuous <Continuous>  pantoprazole Infusion 8 mG/Hr (10 mL/Hr) IV Continuous <Continuous>  thiamine 100 milliGRAM(s) Oral daily    MEDICATIONS  (PRN):  LORazepam   Injectable 2 milliGRAM(s) IV Push every 4 hours PRN CIWA 8-14

## 2025-03-21 NOTE — PROGRESS NOTE ADULT - SUBJECTIVE AND OBJECTIVE BOX
Patient seen and evaluated this am, comfortable in bed, no bleeding, on Protonix and octreotide drips      T(F): 98.3 (03-21-25 @ 11:05), Max: 98.8 (03-20-25 @ 19:00)  HR: 99 (03-21-25 @ 11:16)  BP: 126/70 (03-21-25 @ 11:16)  RR: 18 (03-21-25 @ 11:16)  SpO2: 99% (03-21-25 @ 11:16) (96% - 99%)    PHYSICAL EXAM:  GENERAL: NAD  HEAD:  Atraumatic, Normocephalic  EYES: EOMI, PERRLA, conjunctiva and sclera clear  NERVOUS SYSTEM:  Alert & Oriented X3, no focal deficits   CHEST/LUNG: Clear to percussion bilaterally; No rales, rhonchi, wheezing, or rubs  HEART: Regular rate and rhythm; No murmurs, rubs, or gallops  ABDOMEN: Soft, Nontender, Nondistended; Bowel sounds present  EXTREMITIES:  2+ Peripheral Pulses, No clubbing, cyanosis, or edema    LABS  03-21    144  |  113[H]  |  12  ----------------------------<  105[H]  3.4[L]   |  24  |  0.8    Ca    7.1[L]      21 Mar 2025 05:55  Phos  2.5     03-20  Mg     1.7     03-21    TPro  5.1[L]  /  Alb  2.6[L]  /  TBili  1.0  /  DBili  x   /  AST  89[H]  /  ALT  54[H]  /  AlkPhos  108  03-21                          7.3    7.52  )-----------( 135      ( 21 Mar 2025 05:55 )             23.0     PT/INR - ( 21 Mar 2025 05:55 )   PT: 15.60 sec;   INR: 1.31 ratio        RADIOLOGY  < from: CT Abdomen and Pelvis w/ IV Cont (03.18.25 @ 22:43) >  IMPRESSION:    No evidence of an acute traumatic solid organ or osseous injury.    Cirrhotic liver. Small ascites.    < end of copied text >    MEDICATIONS  (STANDING):  amLODIPine   Tablet 5 milliGRAM(s) Oral daily  cefTRIAXone   IVPB 1000 milliGRAM(s) IV Intermittent every 24 hours  chlorhexidine 2% Cloths 1 Application(s) Topical <User Schedule>  folic acid 1 milliGRAM(s) Oral daily  iron sucrose IVPB 200 milliGRAM(s) IV Intermittent every 24 hours  metoprolol tartrate 25 milliGRAM(s) Oral every 12 hours  multivitamin 1 Tablet(s) Oral daily  nicotine -  14 mG/24Hr(s) Patch 1 Patch Transdermal daily  octreotide  Infusion 50 MICROgram(s)/Hr (10 mL/Hr) IV Continuous <Continuous>  pantoprazole Infusion 8 mG/Hr (10 mL/Hr) IV Continuous <Continuous>  thiamine 100 milliGRAM(s) Oral daily    MEDICATIONS  (PRN):  LORazepam   Injectable 2 milliGRAM(s) IV Push every 4 hours PRN CIWA 8-14

## 2025-03-21 NOTE — DIETITIAN INITIAL EVALUATION ADULT - OTHER INFO
p tis 69 year old male with hx of ETOH abuse, + smoker, p/w dizziness and fall. pt found to be anemic s/p 2u PRBC's, liver cirrhosis, intermittent melanic stools. s/p EGD--> PUD, erosive gastritis, esophageal varices 8 bands placed. s/p octreotide.

## 2025-03-21 NOTE — PROGRESS NOTE ADULT - SUBJECTIVE AND OBJECTIVE BOX
Patient is a 69y old  Male who presents with a chief complaint of GI bleeding (20 Mar 2025 11:32)        Over Night Events:  overnight off pressors on ra.              PHYSICAL EXAM    ICU Vital Signs Last 24 Hrs  T(C): 36.9 (20 Mar 2025 23:16), Max: 37.1 (20 Mar 2025 15:00)  T(F): 98.4 (20 Mar 2025 23:16), Max: 98.8 (20 Mar 2025 19:00)  HR: 98 (21 Mar 2025 02:22) (79 - 98)  BP: 114/66 (21 Mar 2025 02:22) (107/71 - 142/83)  BP(mean): 84 (21 Mar 2025 02:22) (81 - 106)  ABP: --  ABP(mean): --  RR: 24 (21 Mar 2025 02:22) (12 - 34)  SpO2: 97% (21 Mar 2025 02:22) (95% - 99%)    O2 Parameters below as of 21 Mar 2025 02:22  Patient On (Oxygen Delivery Method): room air            CONSTITUTIONAL:  comfortable laying in bed    ENT:   Airway patent,   Mouth with normal mucosa.   No thrush    EYES:   Pupils equal,   Round and reactive to light.    CARDIAC:   Normal rate,   Regular rhythm.      RESPIRATORY:   No wheezing  Bilateral BS  Normal chest expansion    GASTROINTESTINAL:  Abdomen soft,   Non-tender,   No guarding,   + BS    MUSCULOSKELETAL:   Range of motion is not limited,  No clubbing, cyanosis    NEUROLOGICAL:   Alert and oriented   No motor  deficits.    SKIN:   Skin normal color for race,   Warm and dry and intact.       03-20-25 @ 07:01  -  03-21-25 @ 07:00  --------------------------------------------------------  IN:    dextrose 5% + sodium chloride 0.9%: 900 mL    IV PiggyBack: 50 mL    Octreotide: 120 mL    Oral Fluid: 860 mL    Pantoprazole: 120 mL  Total IN: 2050 mL    OUT:    Voided (mL): 1100 mL  Total OUT: 1100 mL    Total NET: 950 mL          LABS:                            7.3    7.52  )-----------( 135      ( 21 Mar 2025 05:55 )             23.0                                               03-20    143  |  114[H]  |  19  ----------------------------<  100[H]  3.9   |  22  |  0.7    Ca    7.4[L]      20 Mar 2025 06:05  Phos  2.5     03-20  Mg     1.8     03-20    TPro  5.2[L]  /  Alb  2.7[L]  /  TBili  1.1  /  DBili  x   /  AST  98[H]  /  ALT  59[H]  /  AlkPhos  110  03-20      PT/INR - ( 21 Mar 2025 05:55 )   PT: 15.60 sec;   INR: 1.31 ratio                                                Urinalysis Basic - ( 20 Mar 2025 06:05 )    Color: x / Appearance: x / SG: x / pH: x  Gluc: 100 mg/dL / Ketone: x  / Bili: x / Urobili: x   Blood: x / Protein: x / Nitrite: x   Leuk Esterase: x / RBC: x / WBC x   Sq Epi: x / Non Sq Epi: x / Bacteria: x                                                  LIVER FUNCTIONS - ( 20 Mar 2025 06:05 )  Alb: 2.7 g/dL / Pro: 5.2 g/dL / ALK PHOS: 110 U/L / ALT: 59 U/L / AST: 98 U/L / GGT: x                                                                                                                                       MEDICATIONS  (STANDING):  cefTRIAXone   IVPB 1000 milliGRAM(s) IV Intermittent every 24 hours  chlorhexidine 2% Cloths 1 Application(s) Topical <User Schedule>  dextrose 5% + sodium chloride 0.9%. 1000 milliLiter(s) (75 mL/Hr) IV Continuous <Continuous>  folic acid 1 milliGRAM(s) Oral daily  multivitamin 1 Tablet(s) Oral daily  nicotine -  14 mG/24Hr(s) Patch 1 Patch Transdermal daily  octreotide  Infusion 50 MICROgram(s)/Hr (10 mL/Hr) IV Continuous <Continuous>  pantoprazole Infusion 8 mG/Hr (10 mL/Hr) IV Continuous <Continuous>  thiamine 100 milliGRAM(s) Oral daily    MEDICATIONS  (PRN):  LORazepam   Injectable 2 milliGRAM(s) IV Push every 4 hours PRN CIWA 8-14     Patient is a 69y old  Male who presents with a chief complaint of GI bleeding (20 Mar 2025 11:32)        Over Night Events:  overnight off pressors on ra.   on PPI drip   octreotide drip            PHYSICAL EXAM    ICU Vital Signs Last 24 Hrs  T(C): 36.9 (20 Mar 2025 23:16), Max: 37.1 (20 Mar 2025 15:00)  T(F): 98.4 (20 Mar 2025 23:16), Max: 98.8 (20 Mar 2025 19:00)  HR: 98 (21 Mar 2025 02:22) (79 - 98)  BP: 114/66 (21 Mar 2025 02:22) (107/71 - 142/83)  BP(mean): 84 (21 Mar 2025 02:22) (81 - 106)  ABP: --  ABP(mean): --  RR: 24 (21 Mar 2025 02:22) (12 - 34)  SpO2: 97% (21 Mar 2025 02:22) (95% - 99%)    O2 Parameters below as of 21 Mar 2025 02:22  Patient On (Oxygen Delivery Method): room air            CONSTITUTIONAL:  comfortable laying in bed    ENT:   Airway patent,   Mouth with normal mucosa.   No thrush    EYES:   Pupils equal,   Round and reactive to light.    CARDIAC:   Normal rate,   Regular rhythm.      RESPIRATORY:   No wheezing  Bilateral BS  Normal chest expansion    GASTROINTESTINAL:  Abdomen soft,   Non-tender,   No guarding,   + BS    MUSCULOSKELETAL:   Range of motion is not limited,  No clubbing, cyanosis    NEUROLOGICAL:   Alert and oriented   No motor  deficits.    SKIN:   Skin normal color for race,   Warm and dry and intact.       03-20-25 @ 07:01  -  03-21-25 @ 07:00  --------------------------------------------------------  IN:    dextrose 5% + sodium chloride 0.9%: 900 mL    IV PiggyBack: 50 mL    Octreotide: 120 mL    Oral Fluid: 860 mL    Pantoprazole: 120 mL  Total IN: 2050 mL    OUT:    Voided (mL): 1100 mL  Total OUT: 1100 mL    Total NET: 950 mL          LABS:                            7.3    7.52  )-----------( 135      ( 21 Mar 2025 05:55 )             23.0                                               03-20    143  |  114[H]  |  19  ----------------------------<  100[H]  3.9   |  22  |  0.7    Ca    7.4[L]      20 Mar 2025 06:05  Phos  2.5     03-20  Mg     1.8     03-20    TPro  5.2[L]  /  Alb  2.7[L]  /  TBili  1.1  /  DBili  x   /  AST  98[H]  /  ALT  59[H]  /  AlkPhos  110  03-20      PT/INR - ( 21 Mar 2025 05:55 )   PT: 15.60 sec;   INR: 1.31 ratio                                                Urinalysis Basic - ( 20 Mar 2025 06:05 )    Color: x / Appearance: x / SG: x / pH: x  Gluc: 100 mg/dL / Ketone: x  / Bili: x / Urobili: x   Blood: x / Protein: x / Nitrite: x   Leuk Esterase: x / RBC: x / WBC x   Sq Epi: x / Non Sq Epi: x / Bacteria: x                                                  LIVER FUNCTIONS - ( 20 Mar 2025 06:05 )  Alb: 2.7 g/dL / Pro: 5.2 g/dL / ALK PHOS: 110 U/L / ALT: 59 U/L / AST: 98 U/L / GGT: x                                                                                                                                       MEDICATIONS  (STANDING):  cefTRIAXone   IVPB 1000 milliGRAM(s) IV Intermittent every 24 hours  chlorhexidine 2% Cloths 1 Application(s) Topical <User Schedule>  dextrose 5% + sodium chloride 0.9%. 1000 milliLiter(s) (75 mL/Hr) IV Continuous <Continuous>  folic acid 1 milliGRAM(s) Oral daily  multivitamin 1 Tablet(s) Oral daily  nicotine -  14 mG/24Hr(s) Patch 1 Patch Transdermal daily  octreotide  Infusion 50 MICROgram(s)/Hr (10 mL/Hr) IV Continuous <Continuous>  pantoprazole Infusion 8 mG/Hr (10 mL/Hr) IV Continuous <Continuous>  thiamine 100 milliGRAM(s) Oral daily    MEDICATIONS  (PRN):  LORazepam   Injectable 2 milliGRAM(s) IV Push every 4 hours PRN CIWA 8-14

## 2025-03-21 NOTE — PROGRESS NOTE ADULT - ASSESSMENT
69 year old male with a  medical history of Hypertension, HLD, chronic  atrial fibrillation on Eliquis, daily alcohol use  comes to emergency room for syncope. patient states that over the last week has been having black stools and increasing weakness. patient  felt lightheaded and passed out and hit head.     syncope / GI bleed / esophageal varices s/p banding / acute blood loss anemia     - HGB from 6.3 -> 7.3 s/p transfusion of 3 units PRBC   - continue IV octreotide and Protonix drips x 72hrs   - hold Eliquis   - advance diet  today   - follow H/H   - ativan prn for elevated CIWA   -I discussed plan with GI team   - supplement hypokalemia and hypomagnesemia   - out patient ENT eval   - 50 minutes total spent today on patient care

## 2025-03-21 NOTE — PROGRESS NOTE ADULT - ASSESSMENT
IMPRESSION:  gib secondary to esophogeal varices s/p egd.  HO afib on eliquis  etoh abuse  cirrhosis  active smoker    PLAN:      CNS: CIWA PRN, continue thiamine/folate, addiction consult.     HEENT: Oral care    PULMONARY:  HOB @ 45 degrees.  Aspiration precautions, on ra cxr clear    CARDIOVASCULAR: avoid volume overload, MAP adequate,      GI: GI prophylaxis PPI drip, will fu with gi to transition to BID dosing, complete octreotide, advance diet as tolerated    RENAL:  Follow up lytes.  Correct as needed, once off octreotide start coreg.     INFECTIOUS DISEASE: SBP prophylaxis with ceftriaxone     HEMATOLOGICAL:  start dvt prophylaxis, fu gi regarding starting eliquis. transfuse prn to keep hgb >7    ENDOCRINE:  Follow up FS.  Insulin protocol if needed.    MUSCULOSKELETAL: ambulate as tolerated    floor         depression

## 2025-03-21 NOTE — PROGRESS NOTE ADULT - SUBJECTIVE AND OBJECTIVE BOX
PURNIMAALEX SHOLA  69y  Male      Patient is a 69y old  Male who presents with a chief complaint of GI bleeding (21 Mar 2025 07:22)      INTERVAL HPI/OVERNIGHT EVENTS:  No overnight events       T(C): 36.9 (03-21-25 @ 07:05), Max: 37.1 (03-20-25 @ 15:00)  HR: 118 (03-21-25 @ 08:03) (80 - 118)  BP: 137/76 (03-21-25 @ 08:03) (107/71 - 142/83)  RR: 27 (03-21-25 @ 08:03) (12 - 32)  SpO2: 96% (03-21-25 @ 08:03) (96% - 99%)  Wt(kg): --Vital Signs Last 24 Hrs  T(C): 36.9 (21 Mar 2025 07:05), Max: 37.1 (20 Mar 2025 15:00)  T(F): 98.4 (21 Mar 2025 07:05), Max: 98.8 (20 Mar 2025 19:00)  HR: 118 (21 Mar 2025 08:03) (80 - 118)  BP: 137/76 (21 Mar 2025 08:03) (107/71 - 142/83)  BP(mean): 100 (21 Mar 2025 08:03) (84 - 106)  RR: 27 (21 Mar 2025 08:03) (12 - 32)  SpO2: 96% (21 Mar 2025 08:03) (96% - 99%)    Parameters below as of 21 Mar 2025 02:22  Patient On (Oxygen Delivery Method): room air      PHYSICAL EXAM:  GENERAL: NAD, well-groomed, well-developed  NECK: Supple, No JVD, Normal thyroid  NERVOUS SYSTEM:  Alert & Oriented X3, Motor Strength 5/5 B/L upper and lower extremities;   CHEST/LUNG: Clear to percussion bilaterally; No rales, rhonchi, wheezing, or rubs  HEART: Regular rate and rhythm; No murmurs, rubs, or gallops  ABDOMEN: Soft, Nontender, Nondistended; Bowel sounds present  EXTREMITIES:  2+ Peripheral Pulses, No clubbing, cyanosis, or edema    Labs reviewed   Imaging Reviewed     amLODIPine   Tablet 5 milliGRAM(s) Oral daily  cefTRIAXone   IVPB 1000 milliGRAM(s) IV Intermittent every 24 hours  chlorhexidine 2% Cloths 1 Application(s) Topical <User Schedule>  folic acid 1 milliGRAM(s) Oral daily  iron sucrose IVPB 200 milliGRAM(s) IV Intermittent every 24 hours  LORazepam   Injectable 2 milliGRAM(s) IV Push every 4 hours PRN  metoprolol tartrate 25 milliGRAM(s) Oral every 12 hours  multivitamin 1 Tablet(s) Oral daily  nicotine -  14 mG/24Hr(s) Patch 1 Patch Transdermal daily  octreotide  Infusion 50 MICROgram(s)/Hr IV Continuous <Continuous>  pantoprazole Infusion 8 mG/Hr IV Continuous <Continuous>  thiamine 100 milliGRAM(s) Oral daily

## 2025-03-21 NOTE — DIETITIAN INITIAL EVALUATION ADULT - PERTINENT LABORATORY DATA
03-21    144  |  113[H]  |  12  ----------------------------<  105[H]  3.4[L]   |  24  |  0.8    Ca    7.1[L]      21 Mar 2025 05:55  Phos  2.5     03-20  Mg     1.7     03-21    TPro  5.1[L]  /  Alb  2.6[L]  /  TBili  1.0  /  DBili  x   /  AST  89[H]  /  ALT  54[H]  /  AlkPhos  108  03-21                          7.5    8.06  )-----------( 145      ( 21 Mar 2025 15:28 )             23.6

## 2025-03-21 NOTE — DIETITIAN INITIAL EVALUATION ADULT - ORAL INTAKE PTA/DIET HISTORY
as per pt consumes a regular diet PTA with good po intake. NKFA or intolerances. Pt denies any recent weight changes. As per EMR review weight stable for past year.    pt is presently on a full liquids diet tolerating well

## 2025-03-21 NOTE — PROGRESS NOTE ADULT - SUBJECTIVE AND OBJECTIVE BOX
69yMale  Being followed for liver cirrhosis, upper GI bleed  Interval history: Patient denies nausea, vomiting, hematemesis, melena, blood in stool, diarrhea, constipation, abdominal pain. Patient tolerating diet.      PAST MEDICAL & SURGICAL HISTORY:  Atrial fibrillation      Seizures      Collar bone fracture                Social History: No smoking. + alcohol. No illegal drug use.            MEDICATIONS  (STANDING):  amLODIPine   Tablet 5 milliGRAM(s) Oral daily  cefTRIAXone   IVPB 1000 milliGRAM(s) IV Intermittent every 24 hours  chlorhexidine 2% Cloths 1 Application(s) Topical <User Schedule>  folic acid 1 milliGRAM(s) Oral daily  iron sucrose IVPB 200 milliGRAM(s) IV Intermittent every 24 hours  metoprolol tartrate 25 milliGRAM(s) Oral every 12 hours  multivitamin 1 Tablet(s) Oral daily  nicotine -  14 mG/24Hr(s) Patch 1 Patch Transdermal daily  octreotide  Infusion 50 MICROgram(s)/Hr (10 mL/Hr) IV Continuous <Continuous>  pantoprazole Infusion 8 mG/Hr (10 mL/Hr) IV Continuous <Continuous>  thiamine 100 milliGRAM(s) Oral daily    MEDICATIONS  (PRN):  LORazepam   Injectable 2 milliGRAM(s) IV Push every 4 hours PRN CIWA 8-14      Allergies:   No Known Allergies  Intolerances          REVIEW OF SYSTEMS:  General:  No weight loss, fevers, or chills.  Eyes:  No reported pain or visual changes  ENT:  No sore throat or runny nose.  NECK: No stiffness   CV:  No chest pain or palpitations.  Resp:  No shortness of breath, cough  GI:  No abdominal pain, nausea, vomiting, dysphagia, diarrhea or constipation. No rectal bleeding, melena, or hematemesis.  Neuro:  No tingling, numbness         VITAL SIGNS:   T(F): 98.4 (03-21-25 @ 07:05), Max: 98.8 (03-20-25 @ 19:00)  HR: 118 (03-21-25 @ 08:03) (80 - 118)  BP: 137/76 (03-21-25 @ 08:03) (107/71 - 142/83)  RR: 27 (03-21-25 @ 08:03) (12 - 32)  SpO2: 96% (03-21-25 @ 08:03) (96% - 99%)    PHYSICAL EXAM:  GENERAL: AAOx3, no acute distress.  HEAD:  Atraumatic, Normocephalic  EYES: conjunctiva and sclera clear  NECK: Supple, no thyromegaly  CHEST/LUNG: Clear to auscultation bilaterally; No wheeze, rhonchi, or rales  HEART: Regular rate and rhythm; normal S1, S2, No murmurs.  ABDOMEN: Soft, nontender, nondistended; Bowel sounds present  NEUROLOGY: No asterixis or tremor.   SKIN: Intact, no jaundice            LABS:                        7.3    7.52  )-----------( 135      ( 21 Mar 2025 05:55 )             23.0     03-21    144  |  113[H]  |  12  ----------------------------<  105[H]  3.4[L]   |  24  |  0.8    Ca    7.1[L]      21 Mar 2025 05:55  Phos  2.5     03-20  Mg     1.7     03-21    TPro  5.1[L]  /  Alb  2.6[L]  /  TBili  1.0  /  DBili  x   /  AST  89[H]  /  ALT  54[H]  /  AlkPhos  108  03-21    LIVER FUNCTIONS - ( 21 Mar 2025 05:55 )  Alb: 2.6 g/dL / Pro: 5.1 g/dL / ALK PHOS: 108 U/L / ALT: 54 U/L / AST: 89 U/L / GGT: x           PT/INR - ( 21 Mar 2025 05:55 )   PT: 15.60 sec;   INR: 1.31 ratio             IMAGING:    < from: CT Abdomen and Pelvis w/ IV Cont (03.18.25 @ 22:43) >    ACC: [id number] EXAM:  CT ABDOMEN AND PELVIS IC   ORDERED BY: MADIHA WATTS     ACC: [id number] EXAM:  CT CHEST IC   ORDERED BY: MADIHA WATTS     PROCEDURE DATE:  03/18/2025          INTERPRETATION:  STUDY INDICATION: trauma    TECHNIQUE: CT of the chest, abdomen and pelvis was performed following   administration of IV contrast. Oral contrast was not administered.    Reformatted images in the coronal and sagittal planes were acquired.  CONTRAST VOLUME: Omnipaque 350 (accession [id number]), NONE (accession   [id number]). 90 cc administered. 10 cc discarded.    COMPARISON CT: None.    QUALITY STATEMENT: Patient upper extremities causing streak artifact   inherently degrades image quality and limits this exam.    FINDINGS:    CHEST    MEDIASTINUM/LYMPH NODES: No lymphadenopathy by size criteria..    HEART/GREAT VESSELS: Left atrial enlargement. No pericardial effusion.   Normal caliber aorta.  Multivessel coronary arterial calcifications.   Aortic valvular calcifications.    LUNGS, PLEURA,AND AIRWAYS: Central airways are patent. No mass or   consolidation. No pneumothorax or pleural effusion.    ABDOMEN/PELVIS    HEPATOBILIARY: Cirrhotic. Cholelithiasis..    SPLEEN: Unremarkable.    PANCREAS: Unremarkable.    ADRENAL GLANDS: Unremarkable.    KIDNEYS: Unremarkable.    ABDOMINOPELVIC NODES: Unremarkable.    PELVIC ORGANS: Unremarkable.    PERITONEUM/MESENTERY/BOWEL: No bowel obstruction, pneumoperitoneum or   pneumatosis. Small ascites.  Normal appendix.    BONES/SOFT TISSUES: No acute osseous abnormality.    OTHER/VASCULAR: Normal caliber aorta.      IMPRESSION:    No evidence of an acute traumatic solid organ or osseous injury.    Cirrhotic liver. Small ascites.    --- End of Report ---            LIZZY BRADY MD; Attending Radiologist  This document has been electronically signed. Mar 19 2025 12:14AM    < end of copied text >

## 2025-03-21 NOTE — PROGRESS NOTE ADULT - ATTENDING COMMENTS
patient seen and examined agree above note   PPI drip and  octreotide drip total 72 hrs will be done tonight   then switch PPi to Q12 hrs IV   off eliquis mild drop h/h follow GI possible start over the weekend if h/h remain stable   follow h/h   thiamine folate   SDU

## 2025-03-21 NOTE — PROGRESS NOTE ADULT - ASSESSMENT
69 y.o M w/ hx of A-fib on eliquis last dose 3/18/25, active ETOH use, active cigarette smoking, presents for weakness, seen for variceal UGIB s/p EGD w/ banding on 3/19/25, being followed.   melena x 1 week. takes advil as well 2-3 daily for knee pain.     #Cirrhotic liver  #active ETOH use   #altered LFTs  #intermittent melenic stools   #acute blood loss anemia     3/19/2025-rectal exam-melenic stool in rectal vault and on finger  ddx PUD, esophageal varices, erosive gastritis    s/p EGD 3/19/25 Dr. Blount  Impressions:  -4 cords of large Varices in the lower third of the esophagus. (Ligation x 9 bands).  -Vocal Cords: Nodules seen on vocal cords.  -Congestion, petechiae and mosaic mucosal pattern inthe stomach.  -Normal mucosa in the whole examined duodenum.      Rec  -ENT evaluation for vocal cord lesion.  -Protonix drip x 72 hours total then can do PO BID x 8 weeks  -Octreotide drip x 3 days and then D/C  -once off octreotide, may place on coreg 3.125 q12h to target HR 55-60  -resume Eliquis as indicated in the evening 24 hours after procedure   -Trend CBC and transfuse PRN to keep HGB > 7  -Avoid NSAIDs.  -Alcohol abstinence.  -Follow up with our [facility] located at [address] [place] [address]. Phone Number: [phone]   -Maintain Hemodynamic Stability  -MELD will likely be altered given eliquis and INR   -Monitor CBC  -CMP,Optimize Electrolytes  -PT,PTT,INR  -EKG, Chest-Xray   -Transfuse prn to hgb >7  -Two large bore IV lines  -Monitor Vital Signs  -Monitor Stool For blood, frequency, consistency, melena  -Active Type and Screen  -Iron Studies, Folate, Vitamin B12 levels     -Hepatic encephalopathy-none  -HCC screening; no liver lesions on CT. OP f/u w/ hepatology for Abdominal ultrasound AFP every 6 months for HCC screening  -EGD outpatient for esophageal varices screening in 3 weeks for repeat banding  -Check Hepatitis B Sag, Hep B SAB, hep B core Ab, Hep A Ab, Hep C Ab  -Follow up with our [facility] located at [address] [place] [address]. Phone Number: [phone] for possible liver transplant referral  -Alcohol and tobacco Cessation Strongly Advised and Encouraged   -For Vaccinations: Please f/u in clinic for being uptodate with HAV/HBV/Influenza/Pneumococcal vaccines.  -Lifestyle/Dietary modifications: Calorie intake 25-40 Kcal/kg/day; Protein intake: 1.2-1.5 gm/kg/day  -Avoid smoking and NSAIDs  -Colonoscopy as outpatient Follow up with our [facility] located at [address] [place] [address]. Phone Number: [phone]   -Withdrawals per CIWA protocol

## 2025-03-22 LAB
ALBUMIN SERPL ELPH-MCNC: 2.7 G/DL — LOW (ref 3.5–5.2)
ALP SERPL-CCNC: 121 U/L — HIGH (ref 30–115)
ALT FLD-CCNC: 58 U/L — HIGH (ref 0–41)
ANA TITR SER: NEGATIVE — SIGNIFICANT CHANGE UP
ANION GAP SERPL CALC-SCNC: 6 MMOL/L — LOW (ref 7–14)
AST SERPL-CCNC: 91 U/L — HIGH (ref 0–41)
BASOPHILS # BLD AUTO: 0.04 K/UL — SIGNIFICANT CHANGE UP (ref 0–0.2)
BASOPHILS NFR BLD AUTO: 0.7 % — SIGNIFICANT CHANGE UP (ref 0–1)
BILIRUB SERPL-MCNC: 1 MG/DL — SIGNIFICANT CHANGE UP (ref 0.2–1.2)
BUN SERPL-MCNC: 11 MG/DL — SIGNIFICANT CHANGE UP (ref 10–20)
CALCIUM SERPL-MCNC: 7.9 MG/DL — LOW (ref 8.4–10.5)
CHLORIDE SERPL-SCNC: 110 MMOL/L — SIGNIFICANT CHANGE UP (ref 98–110)
CO2 SERPL-SCNC: 23 MMOL/L — SIGNIFICANT CHANGE UP (ref 17–32)
CREAT SERPL-MCNC: 0.7 MG/DL — SIGNIFICANT CHANGE UP (ref 0.7–1.5)
EGFR: 101 ML/MIN/1.73M2 — SIGNIFICANT CHANGE UP
EGFR: 101 ML/MIN/1.73M2 — SIGNIFICANT CHANGE UP
EOSINOPHIL # BLD AUTO: 0.23 K/UL — SIGNIFICANT CHANGE UP (ref 0–0.7)
EOSINOPHIL NFR BLD AUTO: 3.9 % — SIGNIFICANT CHANGE UP (ref 0–8)
GLUCOSE SERPL-MCNC: 88 MG/DL — SIGNIFICANT CHANGE UP (ref 70–99)
HCT VFR BLD CALC: 23.4 % — LOW (ref 42–52)
HCV RNA FLD QL NAA+PROBE: SIGNIFICANT CHANGE UP
HCV RNA SPEC QL PROBE+SIG AMP: DETECTED
HGB BLD-MCNC: 7.4 G/DL — LOW (ref 14–18)
IMM GRANULOCYTES NFR BLD AUTO: 0.3 % — SIGNIFICANT CHANGE UP (ref 0.1–0.3)
LYMPHOCYTES # BLD AUTO: 1.28 K/UL — SIGNIFICANT CHANGE UP (ref 1.2–3.4)
LYMPHOCYTES # BLD AUTO: 21.9 % — SIGNIFICANT CHANGE UP (ref 20.5–51.1)
MAGNESIUM SERPL-MCNC: 1.8 MG/DL — SIGNIFICANT CHANGE UP (ref 1.8–2.4)
MCHC RBC-ENTMCNC: 27.5 PG — SIGNIFICANT CHANGE UP (ref 27–31)
MCHC RBC-ENTMCNC: 31.6 G/DL — LOW (ref 32–37)
MCV RBC AUTO: 87 FL — SIGNIFICANT CHANGE UP (ref 80–94)
MONOCYTES # BLD AUTO: 0.66 K/UL — HIGH (ref 0.1–0.6)
MONOCYTES NFR BLD AUTO: 11.3 % — HIGH (ref 1.7–9.3)
NEUTROPHILS # BLD AUTO: 3.61 K/UL — SIGNIFICANT CHANGE UP (ref 1.4–6.5)
NEUTROPHILS NFR BLD AUTO: 61.9 % — SIGNIFICANT CHANGE UP (ref 42.2–75.2)
NRBC BLD AUTO-RTO: 0 /100 WBCS — SIGNIFICANT CHANGE UP (ref 0–0)
PLATELET # BLD AUTO: 130 K/UL — SIGNIFICANT CHANGE UP (ref 130–400)
PMV BLD: 13.1 FL — HIGH (ref 7.4–10.4)
POTASSIUM SERPL-MCNC: 3.6 MMOL/L — SIGNIFICANT CHANGE UP (ref 3.5–5)
POTASSIUM SERPL-SCNC: 3.6 MMOL/L — SIGNIFICANT CHANGE UP (ref 3.5–5)
PROT SERPL-MCNC: 5.3 G/DL — LOW (ref 6–8)
RBC # BLD: 2.69 M/UL — LOW (ref 4.7–6.1)
RBC # FLD: 17.2 % — HIGH (ref 11.5–14.5)
SODIUM SERPL-SCNC: 139 MMOL/L — SIGNIFICANT CHANGE UP (ref 135–146)
WBC # BLD: 5.84 K/UL — SIGNIFICANT CHANGE UP (ref 4.8–10.8)
WBC # FLD AUTO: 5.84 K/UL — SIGNIFICANT CHANGE UP (ref 4.8–10.8)

## 2025-03-22 PROCEDURE — 99232 SBSQ HOSP IP/OBS MODERATE 35: CPT

## 2025-03-22 RX ORDER — CARVEDILOL 3.12 MG/1
1 TABLET, FILM COATED ORAL
Qty: 60 | Refills: 0
Start: 2025-03-22 | End: 2025-04-20

## 2025-03-22 RX ORDER — AMLODIPINE BESYLATE 10 MG/1
1 TABLET ORAL
Qty: 30 | Refills: 0
Start: 2025-03-22 | End: 2025-04-20

## 2025-03-22 RX ORDER — FOLIC ACID 1 MG/1
1 TABLET ORAL
Qty: 30 | Refills: 0
Start: 2025-03-22 | End: 2025-04-20

## 2025-03-22 RX ORDER — B1/B2/B3/B5/B6/B12/VIT C/FOLIC 500-0.5 MG
1 TABLET ORAL
Qty: 30 | Refills: 0
Start: 2025-03-22 | End: 2025-04-20

## 2025-03-22 RX ORDER — METOPROLOL SUCCINATE 50 MG/1
1 TABLET, EXTENDED RELEASE ORAL
Refills: 0 | DISCHARGE

## 2025-03-22 RX ADMIN — CARVEDILOL 3.12 MILLIGRAM(S): 3.12 TABLET, FILM COATED ORAL at 05:52

## 2025-03-22 RX ADMIN — Medication 40 MILLIGRAM(S): at 17:45

## 2025-03-22 RX ADMIN — ENOXAPARIN SODIUM 100 MILLIGRAM(S): 100 INJECTION SUBCUTANEOUS at 05:52

## 2025-03-22 RX ADMIN — Medication 1 APPLICATION(S): at 05:53

## 2025-03-22 RX ADMIN — Medication 100 MILLIGRAM(S): at 11:28

## 2025-03-22 RX ADMIN — CEFTRIAXONE 100 MILLIGRAM(S): 500 INJECTION, POWDER, FOR SOLUTION INTRAMUSCULAR; INTRAVENOUS at 22:12

## 2025-03-22 RX ADMIN — FOLIC ACID 1 MILLIGRAM(S): 1 TABLET ORAL at 11:28

## 2025-03-22 RX ADMIN — Medication 40 MILLIGRAM(S): at 05:52

## 2025-03-22 RX ADMIN — CARVEDILOL 3.12 MILLIGRAM(S): 3.12 TABLET, FILM COATED ORAL at 17:45

## 2025-03-22 RX ADMIN — ENOXAPARIN SODIUM 100 MILLIGRAM(S): 100 INJECTION SUBCUTANEOUS at 17:45

## 2025-03-22 RX ADMIN — Medication 1 TABLET(S): at 11:28

## 2025-03-22 RX ADMIN — IRON SUCROSE 100 MILLIGRAM(S): 20 INJECTION, SOLUTION INTRAVENOUS at 11:28

## 2025-03-22 NOTE — PROGRESS NOTE ADULT - ASSESSMENT
IMPRESSION:  gib secondary to esophogeal varices s/p egd.  HO afib on eliquis  etoh abuse  cirrhosis  active smoker    PLAN:      CNS: CIWA PRN, continue thiamine/folate, addiction consult.     HEENT: Oral care    PULMONARY:  HOB @ 45 degrees.  Aspiration precautions, on ra cxr clear    CARDIOVASCULAR: avoid volume overload, MAP adequate,      GI: GI prophylaxis PPI drip, will fu with gi to transition to BID dosing, complete octreotide, advance diet as tolerated    RENAL:  Follow up lytes.  Correct as needed, once off octreotide start coreg.     INFECTIOUS DISEASE: SBP prophylaxis with ceftriaxone     HEMATOLOGICAL:  start dvt prophylaxis, fu gi regarding starting eliquis. transfuse prn to keep hgb >7.  Maintain 2x large bore PIV access.    ENDOCRINE:  Follow up FS.  Insulin protocol if needed.    MUSCULOSKELETAL: ambulate as tolerated      GMF later today once completed 72h gtt         IMPRESSION:  gib secondary to esophogeal varices s/p egd.  HO afib on eliquis  etoh abuse  cirrhosis  active smoker    PLAN:      CNS: CIWA PRN, continue thiamine/folate, addiction consult.     HEENT: Oral care    PULMONARY:  HOB @ 45 degrees.  Aspiration precautions, on ra cxr clear    CARDIOVASCULAR: avoid volume overload, MAP adequate,      GI: GI prophylaxis PPI drip, will fu with gi to transition to BID dosing, complete octreotide, advance diet as tolerated    RENAL:  Follow up lytes.  Correct as needed, once off octreotide start coreg.     INFECTIOUS DISEASE: SBP prophylaxis with ceftriaxone     HEMATOLOGICAL:  start dvt prophylaxis, fu gi regarding starting eliquis. transfuse prn to keep hgb >7.  Maintain 2x large bore PIV access.    ENDOCRINE:  Follow up FS.  Insulin protocol if needed.    MUSCULOSKELETAL: ambulate as tolerated      GMF later today

## 2025-03-22 NOTE — PROGRESS NOTE ADULT - SUBJECTIVE AND OBJECTIVE BOX
Patient is a 69y old  Male who presents with a chief complaint of Gastrointestinal hemorrhage     (21 Mar 2025 18:05)        Over Night Events:    Remains on gtt, will complete 72h today  No further bleeding, Hgb remains stable    ROS:     All ROS are negative except HPI           PHYSICAL EXAM    ICU Vital Signs Last 24 Hrs  T(C): 37 (21 Mar 2025 23:27), Max: 37.5 (21 Mar 2025 19:30)  T(F): 98.6 (21 Mar 2025 23:27), Max: 99.5 (21 Mar 2025 19:30)  HR: 78 (22 Mar 2025 05:04) (75 - 118)  BP: 113/60 (22 Mar 2025 05:04) (113/60 - 137/76)  BP(mean): 82 (22 Mar 2025 05:04) (80 - 100)  ABP: --  ABP(mean): --  RR: 17 (22 Mar 2025 05:04) (16 - 27)  SpO2: 100% (22 Mar 2025 05:04) (95% - 100%)    O2 Parameters below as of 21 Mar 2025 19:25  Patient On (Oxygen Delivery Method): room air            Constitutional: no acute distress, well nourished well developed  Neuro: moving all 4 limbs spontaneously, no facial droop or dysarthria  HEENT: NCAT, anicteric  Neck: no visible lymphadenopathy or goiter  Pulm: no respiratory distress. clear to auscultation bilaterally  Cardiac: extremities appear pink and well-perfused.  regular rhythm and rate, no murmur detected  Abdomen: non-distended  Extremities: no peripheral edema      03-20-25 @ 07:01  -  03-21-25 @ 07:00  --------------------------------------------------------  IN:    dextrose 5% + sodium chloride 0.9%: 975 mL    IV PiggyBack: 50 mL    Octreotide: 130 mL    Oral Fluid: 860 mL    Pantoprazole: 130 mL  Total IN: 2145 mL    OUT:    Voided (mL): 1100 mL  Total OUT: 1100 mL    Total NET: 1045 mL      03-21-25 @ 07:01  -  03-22-25 @ 05:45  --------------------------------------------------------  IN:    dextrose 5% + sodium chloride 0.9%: 150 mL    IV PiggyBack: 300 mL    Octreotide: 80 mL    Octreotide: 100 mL    Pantoprazole: 80 mL    Pantoprazole: 80 mL  Total IN: 790 mL    OUT:    Voided (mL): 250 mL  Total OUT: 250 mL    Total NET: 540 mL          LABS:                            7.5    8.06  )-----------( 145      ( 21 Mar 2025 15:28 )             23.6                                               03-21    144  |  113[H]  |  12  ----------------------------<  105[H]  3.4[L]   |  24  |  0.8    Ca    7.1[L]      21 Mar 2025 05:55  Phos  2.5     03-20  Mg     1.7     03-21    TPro  5.1[L]  /  Alb  2.6[L]  /  TBili  1.0  /  DBili  x   /  AST  89[H]  /  ALT  54[H]  /  AlkPhos  108  03-21      PT/INR - ( 21 Mar 2025 05:55 )   PT: 15.60 sec;   INR: 1.31 ratio                                                Urinalysis Basic - ( 21 Mar 2025 05:55 )    Color: x / Appearance: x / SG: x / pH: x  Gluc: 105 mg/dL / Ketone: x  / Bili: x / Urobili: x   Blood: x / Protein: x / Nitrite: x   Leuk Esterase: x / RBC: x / WBC x   Sq Epi: x / Non Sq Epi: x / Bacteria: x                                                  LIVER FUNCTIONS - ( 21 Mar 2025 05:55 )  Alb: 2.6 g/dL / Pro: 5.1 g/dL / ALK PHOS: 108 U/L / ALT: 54 U/L / AST: 89 U/L / GGT: x                                                                                                                                       MEDICATIONS  (STANDING):  carvedilol 3.125 milliGRAM(s) Oral every 12 hours  cefTRIAXone   IVPB 1000 milliGRAM(s) IV Intermittent every 24 hours  chlorhexidine 2% Cloths 1 Application(s) Topical <User Schedule>  enoxaparin Injectable 100 milliGRAM(s) SubCutaneous every 12 hours  folic acid 1 milliGRAM(s) Oral daily  iron sucrose IVPB 200 milliGRAM(s) IV Intermittent every 24 hours  multivitamin 1 Tablet(s) Oral daily  nicotine -  14 mG/24Hr(s) Patch 1 Patch Transdermal daily  octreotide  Infusion 50 MICROgram(s)/Hr (10 mL/Hr) IV Continuous <Continuous>  pantoprazole    Tablet 40 milliGRAM(s) Oral every 12 hours  pantoprazole Infusion 8 mG/Hr (10 mL/Hr) IV Continuous <Continuous>  thiamine 100 milliGRAM(s) Oral daily    MEDICATIONS  (PRN):  LORazepam   Injectable 2 milliGRAM(s) IV Push every 4 hours PRN CIWA 8-14         Patient is a 69y old  Male who presents with a chief complaint of Gastrointestinal hemorrhage     (21 Mar 2025 18:05)        Over Night Events:    Transitioned off gtt, now on BID dosing  No further bleeding, Hgb remains stable    ROS:     All ROS are negative except HPI           PHYSICAL EXAM    ICU Vital Signs Last 24 Hrs  T(C): 37 (21 Mar 2025 23:27), Max: 37.5 (21 Mar 2025 19:30)  T(F): 98.6 (21 Mar 2025 23:27), Max: 99.5 (21 Mar 2025 19:30)  HR: 78 (22 Mar 2025 05:04) (75 - 118)  BP: 113/60 (22 Mar 2025 05:04) (113/60 - 137/76)  BP(mean): 82 (22 Mar 2025 05:04) (80 - 100)  ABP: --  ABP(mean): --  RR: 17 (22 Mar 2025 05:04) (16 - 27)  SpO2: 100% (22 Mar 2025 05:04) (95% - 100%)    O2 Parameters below as of 21 Mar 2025 19:25  Patient On (Oxygen Delivery Method): room air            Constitutional: no acute distress, well nourished well developed  Neuro: moving all 4 limbs spontaneously, no facial droop or dysarthria  HEENT: NCAT, anicteric  Neck: no visible lymphadenopathy or goiter  Pulm: no respiratory distress. clear to auscultation bilaterally  Cardiac: extremities appear pink and well-perfused.  regular rhythm and rate, no murmur detected  Abdomen: non-distended  Extremities: no peripheral edema      03-20-25 @ 07:01  -  03-21-25 @ 07:00  --------------------------------------------------------  IN:    dextrose 5% + sodium chloride 0.9%: 975 mL    IV PiggyBack: 50 mL    Octreotide: 130 mL    Oral Fluid: 860 mL    Pantoprazole: 130 mL  Total IN: 2145 mL    OUT:    Voided (mL): 1100 mL  Total OUT: 1100 mL    Total NET: 1045 mL      03-21-25 @ 07:01  -  03-22-25 @ 05:45  --------------------------------------------------------  IN:    dextrose 5% + sodium chloride 0.9%: 150 mL    IV PiggyBack: 300 mL    Octreotide: 80 mL    Octreotide: 100 mL    Pantoprazole: 80 mL    Pantoprazole: 80 mL  Total IN: 790 mL    OUT:    Voided (mL): 250 mL  Total OUT: 250 mL    Total NET: 540 mL          LABS:                            7.5    8.06  )-----------( 145      ( 21 Mar 2025 15:28 )             23.6                                               03-21    144  |  113[H]  |  12  ----------------------------<  105[H]  3.4[L]   |  24  |  0.8    Ca    7.1[L]      21 Mar 2025 05:55  Phos  2.5     03-20  Mg     1.7     03-21    TPro  5.1[L]  /  Alb  2.6[L]  /  TBili  1.0  /  DBili  x   /  AST  89[H]  /  ALT  54[H]  /  AlkPhos  108  03-21      PT/INR - ( 21 Mar 2025 05:55 )   PT: 15.60 sec;   INR: 1.31 ratio                                                Urinalysis Basic - ( 21 Mar 2025 05:55 )    Color: x / Appearance: x / SG: x / pH: x  Gluc: 105 mg/dL / Ketone: x  / Bili: x / Urobili: x   Blood: x / Protein: x / Nitrite: x   Leuk Esterase: x / RBC: x / WBC x   Sq Epi: x / Non Sq Epi: x / Bacteria: x                                                  LIVER FUNCTIONS - ( 21 Mar 2025 05:55 )  Alb: 2.6 g/dL / Pro: 5.1 g/dL / ALK PHOS: 108 U/L / ALT: 54 U/L / AST: 89 U/L / GGT: x                                                                                                                                       MEDICATIONS  (STANDING):  carvedilol 3.125 milliGRAM(s) Oral every 12 hours  cefTRIAXone   IVPB 1000 milliGRAM(s) IV Intermittent every 24 hours  chlorhexidine 2% Cloths 1 Application(s) Topical <User Schedule>  enoxaparin Injectable 100 milliGRAM(s) SubCutaneous every 12 hours  folic acid 1 milliGRAM(s) Oral daily  iron sucrose IVPB 200 milliGRAM(s) IV Intermittent every 24 hours  multivitamin 1 Tablet(s) Oral daily  nicotine -  14 mG/24Hr(s) Patch 1 Patch Transdermal daily  octreotide  Infusion 50 MICROgram(s)/Hr (10 mL/Hr) IV Continuous <Continuous>  pantoprazole    Tablet 40 milliGRAM(s) Oral every 12 hours  pantoprazole Infusion 8 mG/Hr (10 mL/Hr) IV Continuous <Continuous>  thiamine 100 milliGRAM(s) Oral daily    MEDICATIONS  (PRN):  LORazepam   Injectable 2 milliGRAM(s) IV Push every 4 hours PRN CIWA 8-14

## 2025-03-23 VITALS — RESPIRATION RATE: 24 BRPM | TEMPERATURE: 98 F | WEIGHT: 224.43 LBS

## 2025-03-23 LAB
ALBUMIN SERPL ELPH-MCNC: 2.7 G/DL — LOW (ref 3.5–5.2)
ALP SERPL-CCNC: 127 U/L — HIGH (ref 30–115)
ALT FLD-CCNC: 67 U/L — HIGH (ref 0–41)
ANION GAP SERPL CALC-SCNC: 7 MMOL/L — SIGNIFICANT CHANGE UP (ref 7–14)
AST SERPL-CCNC: 109 U/L — HIGH (ref 0–41)
BASOPHILS # BLD AUTO: 0.05 K/UL — SIGNIFICANT CHANGE UP (ref 0–0.2)
BASOPHILS NFR BLD AUTO: 0.8 % — SIGNIFICANT CHANGE UP (ref 0–1)
BILIRUB SERPL-MCNC: 0.9 MG/DL — SIGNIFICANT CHANGE UP (ref 0.2–1.2)
BLD GP AB SCN SERPL QL: SIGNIFICANT CHANGE UP
BUN SERPL-MCNC: 9 MG/DL — LOW (ref 10–20)
CALCIUM SERPL-MCNC: 7.7 MG/DL — LOW (ref 8.4–10.5)
CHLORIDE SERPL-SCNC: 108 MMOL/L — SIGNIFICANT CHANGE UP (ref 98–110)
CO2 SERPL-SCNC: 23 MMOL/L — SIGNIFICANT CHANGE UP (ref 17–32)
CREAT SERPL-MCNC: 0.8 MG/DL — SIGNIFICANT CHANGE UP (ref 0.7–1.5)
EGFR: 97 ML/MIN/1.73M2 — SIGNIFICANT CHANGE UP
EGFR: 97 ML/MIN/1.73M2 — SIGNIFICANT CHANGE UP
EOSINOPHIL # BLD AUTO: 0.27 K/UL — SIGNIFICANT CHANGE UP (ref 0–0.7)
EOSINOPHIL NFR BLD AUTO: 4.6 % — SIGNIFICANT CHANGE UP (ref 0–8)
GLUCOSE SERPL-MCNC: 89 MG/DL — SIGNIFICANT CHANGE UP (ref 70–99)
HCT VFR BLD CALC: 23.5 % — LOW (ref 42–52)
HGB BLD-MCNC: 7.6 G/DL — LOW (ref 14–18)
IMM GRANULOCYTES NFR BLD AUTO: 0.3 % — SIGNIFICANT CHANGE UP (ref 0.1–0.3)
INR BLD: 1.36 RATIO — HIGH (ref 0.65–1.3)
LYMPHOCYTES # BLD AUTO: 1.08 K/UL — LOW (ref 1.2–3.4)
LYMPHOCYTES # BLD AUTO: 18.2 % — LOW (ref 20.5–51.1)
MAGNESIUM SERPL-MCNC: 1.7 MG/DL — LOW (ref 1.8–2.4)
MCHC RBC-ENTMCNC: 27.9 PG — SIGNIFICANT CHANGE UP (ref 27–31)
MCHC RBC-ENTMCNC: 32.3 G/DL — SIGNIFICANT CHANGE UP (ref 32–37)
MCV RBC AUTO: 86.4 FL — SIGNIFICANT CHANGE UP (ref 80–94)
MONOCYTES # BLD AUTO: 0.58 K/UL — SIGNIFICANT CHANGE UP (ref 0.1–0.6)
MONOCYTES NFR BLD AUTO: 9.8 % — HIGH (ref 1.7–9.3)
NEUTROPHILS # BLD AUTO: 3.93 K/UL — SIGNIFICANT CHANGE UP (ref 1.4–6.5)
NEUTROPHILS NFR BLD AUTO: 66.3 % — SIGNIFICANT CHANGE UP (ref 42.2–75.2)
NRBC BLD AUTO-RTO: 0 /100 WBCS — SIGNIFICANT CHANGE UP (ref 0–0)
PLATELET # BLD AUTO: 129 K/UL — LOW (ref 130–400)
PMV BLD: 12.7 FL — HIGH (ref 7.4–10.4)
POTASSIUM SERPL-MCNC: 3.6 MMOL/L — SIGNIFICANT CHANGE UP (ref 3.5–5)
POTASSIUM SERPL-SCNC: 3.6 MMOL/L — SIGNIFICANT CHANGE UP (ref 3.5–5)
PROT SERPL-MCNC: 5.4 G/DL — LOW (ref 6–8)
PROTHROM AB SERPL-ACNC: 16.1 SEC — HIGH (ref 9.95–12.87)
RBC # BLD: 2.72 M/UL — LOW (ref 4.7–6.1)
RBC # FLD: 16.7 % — HIGH (ref 11.5–14.5)
SODIUM SERPL-SCNC: 138 MMOL/L — SIGNIFICANT CHANGE UP (ref 135–146)
WBC # BLD: 5.93 K/UL — SIGNIFICANT CHANGE UP (ref 4.8–10.8)
WBC # FLD AUTO: 5.93 K/UL — SIGNIFICANT CHANGE UP (ref 4.8–10.8)

## 2025-03-23 PROCEDURE — 99239 HOSP IP/OBS DSCHRG MGMT >30: CPT

## 2025-03-23 PROCEDURE — 99232 SBSQ HOSP IP/OBS MODERATE 35: CPT

## 2025-03-23 RX ORDER — MAGNESIUM SULFATE 500 MG/ML
2 SYRINGE (ML) INJECTION ONCE
Refills: 0 | Status: COMPLETED | OUTPATIENT
Start: 2025-03-23 | End: 2025-03-23

## 2025-03-23 RX ORDER — RIVAROXABAN 10 MG/1
20 TABLET, FILM COATED ORAL
Refills: 0 | Status: DISCONTINUED | OUTPATIENT
Start: 2025-03-23 | End: 2025-03-23

## 2025-03-23 RX ORDER — CARVEDILOL 3.12 MG/1
1 TABLET, FILM COATED ORAL
Qty: 60 | Refills: 0
Start: 2025-03-23 | End: 2025-04-21

## 2025-03-23 RX ADMIN — IRON SUCROSE 100 MILLIGRAM(S): 20 INJECTION, SOLUTION INTRAVENOUS at 10:46

## 2025-03-23 RX ADMIN — FOLIC ACID 1 MILLIGRAM(S): 1 TABLET ORAL at 11:02

## 2025-03-23 RX ADMIN — ENOXAPARIN SODIUM 100 MILLIGRAM(S): 100 INJECTION SUBCUTANEOUS at 05:55

## 2025-03-23 RX ADMIN — Medication 1 TABLET(S): at 11:02

## 2025-03-23 RX ADMIN — Medication 100 MILLIGRAM(S): at 11:02

## 2025-03-23 RX ADMIN — Medication 40 MILLIGRAM(S): at 05:55

## 2025-03-23 RX ADMIN — CARVEDILOL 3.12 MILLIGRAM(S): 3.12 TABLET, FILM COATED ORAL at 05:55

## 2025-03-23 RX ADMIN — Medication 1 APPLICATION(S): at 05:55

## 2025-03-23 NOTE — DISCHARGE NOTE PROVIDER - ATTENDING DISCHARGE PHYSICAL EXAMINATION:
PHYSICAL EXAM:  Vital Signs Last 24 Hrs  T(C): 36.6 (23 Mar 2025 07:10), Max: 37.2 (23 Mar 2025 00:13)  T(F): 97.9 (23 Mar 2025 07:10), Max: 99 (23 Mar 2025 00:13)  HR: 85 (23 Mar 2025 07:08) (80 - 87)  BP: 122/68 (23 Mar 2025 07:08) (110/65 - 134/63  RR: 18  SpO2: 97% (23 Mar 2025 00:13) (96% - 99%)    Parameters below as of 23 Mar 2025 07:08  Patient On (Oxygen Delivery Method): room air      GENERAL: NAD, well-groomed, well-developed  HEAD:  Atraumatic, Normocephalic  EYES: EOMI, PERRLA, conjunctiva and sclera clear  ENMT: No tonsillar erythema, exudates, or enlargement; Moist mucous membranes, Good dentition, No lesions  NECK: Supple, No JVD, Normal thyroid  NERVOUS SYSTEM:  Alert & Oriented X3, Good concentration; Motor Strength 5/5 B/L upper and lower extremities; DTRs 2+ intact and symmetric  CHEST/LUNG: Clear to percussion bilaterally; No rales, rhonchi, wheezing, or rubs  HEART: Regular rate and rhythm; No murmurs, rubs, or gallops  ABDOMEN: Soft, Nontender, Nondistended; Bowel sounds present  EXTREMITIES:  2+ Peripheral Pulses, No clubbing, cyanosis, or edema  LYMPH: No lymphadenopathy noted  SKIN: No rashes or lesions

## 2025-03-23 NOTE — DISCHARGE NOTE PROVIDER - CARE PROVIDER_API CALL
Ansley Lara  Gastroenterology  [address] [place] [address]  Phone: [phone]  Fax: [phone]  Follow Up Time: 1 week    Dylon Chi  Internal Medicine  [address] [place] [address]  Phone: [phone]  Fax: [phone]  Follow Up Time: 1 week    Vish Summers  Internal Medicine  [address] [place] [address]  Phone: [phone]  Fax: [phone]  Follow Up Time: 1 week    Reinier Suresh  Otolaryngology  [address] [place] [address]  Phone: [phone]  Fax: [phone]  Follow Up Time: 2 weeks

## 2025-03-23 NOTE — PROGRESS NOTE ADULT - SUBJECTIVE AND OBJECTIVE BOX
Patient is a 69y old  Male who presents with a chief complaint of GI bleeding (22 Mar 2025 05:44)        Over Night Events:    No acute events, Hgb stable, no clinical bleeding    ROS:     All ROS are negative except HPI           PHYSICAL EXAM    ICU Vital Signs Last 24 Hrs  T(C): 37.1 (23 Mar 2025 05:18), Max: 37.2 (23 Mar 2025 00:13)  T(F): 98.7 (23 Mar 2025 05:18), Max: 99 (23 Mar 2025 00:13)  HR: 85 (23 Mar 2025 00:13) (80 - 96)  BP: 115/68 (23 Mar 2025 05:18) (105/58 - 134/63)  BP(mean): 86 (23 Mar 2025 05:18) (78 - 96)  ABP: --  ABP(mean): --  RR: 22 (22 Mar 2025 21:00) (17 - 22)  SpO2: 97% (23 Mar 2025 00:13) (96% - 99%)    O2 Parameters below as of 23 Mar 2025 00:13  Patient On (Oxygen Delivery Method): room air            Constitutional: no acute distress, well nourished well developed  Neuro: moving all 4 limbs spontaneously, no facial droop or dysarthria  HEENT: NCAT, anicteric  Neck: no visible lymphadenopathy or goiter  Pulm: no respiratory distress. clear to auscultation bilaterally  Cardiac: extremities appear pink and well-perfused.  regular rhythm and rate, no murmur detected  Abdomen: non-distended  Extremities: no peripheral edema      03-21-25 @ 07:01  -  03-22-25 @ 07:00  --------------------------------------------------------  IN:    dextrose 5% + sodium chloride 0.9%: 150 mL    IV PiggyBack: 300 mL    Octreotide: 80 mL    Octreotide: 100 mL    Oral Fluid: 60 mL    Pantoprazole: 80 mL    Pantoprazole: 80 mL  Total IN: 850 mL    OUT:    Voided (mL): 252 mL  Total OUT: 252 mL    Total NET: 598 mL      03-22-25 @ 07:01  -  03-23-25 @ 06:05  --------------------------------------------------------  IN:    IV PiggyBack: 150 mL  Total IN: 150 mL    OUT:  Total OUT: 0 mL    Total NET: 150 mL          LABS:                            7.4    5.84  )-----------( 130      ( 22 Mar 2025 06:01 )             23.4                                               03-22    139  |  110  |  11  ----------------------------<  88  3.6   |  23  |  0.7    Ca    7.9[L]      22 Mar 2025 06:01  Mg     1.8     03-22    TPro  5.3[L]  /  Alb  2.7[L]  /  TBili  1.0  /  DBili  x   /  AST  91[H]  /  ALT  58[H]  /  AlkPhos  121[H]  03-22                                             Urinalysis Basic - ( 22 Mar 2025 06:01 )    Color: x / Appearance: x / SG: x / pH: x  Gluc: 88 mg/dL / Ketone: x  / Bili: x / Urobili: x   Blood: x / Protein: x / Nitrite: x   Leuk Esterase: x / RBC: x / WBC x   Sq Epi: x / Non Sq Epi: x / Bacteria: x                                                  LIVER FUNCTIONS - ( 22 Mar 2025 06:01 )  Alb: 2.7 g/dL / Pro: 5.3 g/dL / ALK PHOS: 121 U/L / ALT: 58 U/L / AST: 91 U/L / GGT: x                                                                                                                                       MEDICATIONS  (STANDING):  carvedilol 3.125 milliGRAM(s) Oral every 12 hours  cefTRIAXone   IVPB 1000 milliGRAM(s) IV Intermittent every 24 hours  chlorhexidine 2% Cloths 1 Application(s) Topical <User Schedule>  enoxaparin Injectable 100 milliGRAM(s) SubCutaneous every 12 hours  folic acid 1 milliGRAM(s) Oral daily  iron sucrose IVPB 200 milliGRAM(s) IV Intermittent every 24 hours  multivitamin 1 Tablet(s) Oral daily  nicotine -  14 mG/24Hr(s) Patch 1 Patch Transdermal daily  pantoprazole    Tablet 40 milliGRAM(s) Oral every 12 hours  thiamine 100 milliGRAM(s) Oral daily    MEDICATIONS  (PRN):  LORazepam   Injectable 2 milliGRAM(s) IV Push every 4 hours PRN CIWA 8-14

## 2025-03-23 NOTE — DISCHARGE NOTE PROVIDER - NSDCMRMEDTOKEN_GEN_ALL_CORE_FT
amLODIPine 5 mg oral tablet: 1 tab(s) orally once a day  carvedilol 3.125 mg oral tablet: 1 tab(s) orally 2 times a day  folic acid 1 mg oral tablet: 1 tab(s) orally once a day  Multiple Vitamins oral tablet: 1 tab(s) orally once a day  pantoprazole 40 mg oral delayed release tablet: 1 tab(s) orally every 12 hours for 8 weeks; then once daily  rivaroxaban 20 mg oral tablet: 1 tab(s) orally once a day  thiamine 100 mg oral tablet: 1 tab(s) orally once a day

## 2025-03-23 NOTE — DISCHARGE NOTE PROVIDER - PROVIDER TOKENS
PROVIDER:[TOKEN:[[id number]],FOLLOWUP:[1 week]],PROVIDER:[TOKEN:[[id number]],FOLLOWUP:[1 week]],PROVIDER:[TOKEN:[[id number]],FOLLOWUP:[1 week]],PROVIDER:[TOKEN:[[id number]],FOLLOWUP:[2 weeks]]

## 2025-03-23 NOTE — DISCHARGE NOTE PROVIDER - NSDCCPCAREPLAN_GEN_ALL_CORE_FT
PRINCIPAL DISCHARGE DIAGNOSIS  Diagnosis: GI bleed  Assessment and Plan of Treatment: You came for weakness ; found to have a drop in blood levels. You were seen by the gastroenterologist and a upper scope was done and it revealed bleeding esophageal varices (dilated veins secondary to liver cirrhosis). The varices were ligated. You remained clinically stable after that.   Now you are clear for discharge.   - continue with pantoprazole tablet twice per day for 8 weeks then daily after that   -Avoid smoking and NSAIDs  -Alcohol and tobacco Cessation Strongly Advised and Encouraged   - You will need Abdominal ultrasound and blood work every 6 months for liver cancer screening   -Lifestyle/Dietary modifications: Calorie intake 25-40 Kcal/kg/day; Protein intake: 1.2-1.5 gm/kg/day  -Follow up with our [facility] located at [address] [place] [address]. Phone Number: [phone] for vaccination (to make sure you are uptodate with HAV/HBV/Influenza/Pneumococcal vaccines) and possible liver transplant referral  - schedule a follow up appointment with your GI specialist for repeat EGD outpatient in 3 weeks. -> in the [facility] located at [address] [place] [address]. Phone Number: [phone]   - You will need also a screening Colonoscopy as outpatient   - schedule a follow up appointment with your primary care doctor in 1 week   - You must schedule an appointment with an ear nose and throat specialist for evaluation of vocal cord lesion.  - If you experience dizziness, or you see blood in the stools or dark stools, if you start vomiting or you have any fever; seek immediate medical attention or call 911         SECONDARY DISCHARGE DIAGNOSES  Diagnosis: Anemia  Assessment and Plan of Treatment:     Diagnosis: Syncope  Assessment and Plan of Treatment:

## 2025-03-23 NOTE — PROGRESS NOTE ADULT - ASSESSMENT
IMPRESSION:  gib secondary to esophogeal varices s/p egd.  HO afib on eliquis  etoh abuse  cirrhosis  active smoker    PLAN:      CNS: CIWA PRN, continue thiamine/folate, addiction consult.     HEENT: Oral care    PULMONARY:  HOB @ 45 degrees.  Aspiration precautions, on ra cxr clear    CARDIOVASCULAR: avoid volume overload, MAP adequate,      GI: GI prophylaxis PPI drip, will fu with gi to transition to BID dosing, complete octreotide, advance diet as tolerated    RENAL:  Follow up lytes.  Correct as needed, once off octreotide start coreg.     INFECTIOUS DISEASE: SBP prophylaxis with ceftriaxone     HEMATOLOGICAL:  start dvt prophylaxis, fu gi regarding starting eliquis. transfuse prn to keep hgb >7.  Maintain 2x large bore PIV access.    ENDOCRINE:  Follow up FS.  Insulin protocol if needed.    MUSCULOSKELETAL: ambulate as tolerated        GMF, dispo planning

## 2025-03-23 NOTE — DISCHARGE NOTE PROVIDER - CARE PROVIDERS DIRECT ADDRESSES
,[email or web address]

## 2025-03-23 NOTE — PROGRESS NOTE ADULT - REASON FOR ADMISSION
GI bleeding

## 2025-03-23 NOTE — DISCHARGE NOTE NURSING/CASE MANAGEMENT/SOCIAL WORK - FINANCIAL ASSISTANCE
[facility] provides services at a reduced cost to those who are determined to be eligible through [facility]’s financial assistance program. Information regarding [facility]’s financial assistance program can be found by going to [email or web address] or by calling [phone].

## 2025-03-23 NOTE — DISCHARGE NOTE PROVIDER - HOSPITAL COURSE
69 y.o M w/ hx of A-fib on eliquis last dose 3/18/25, active ETOH use, active cigarette smoking, presents for weakness, seen for variceal UGIB s/p EGD w/ banding on 3/19/25, being followed.   melena x 1 week. takes advil as well 2-3 daily for knee pain.     3/19/2025-rectal exam-melenic stool in rectal vault and on finger  s/p EGD 3/19/25 Dr. Blount  Impressions:  -4 cords of large Varices in the lower third of the esophagus. (Ligation x 9 bands).  -Vocal Cords: Nodules seen on vocal cords.  -Congestion, petechiae and mosaic mucosal pattern inthe stomach.  -Normal mucosa in the whole examined duodenum.    #Cirrhotic liver  #active ETOH use   #altered LFTs  #intermittent melenic stools   #acute blood loss anemia   -patient received Protonix drip x 72 hours total and Octreotide drip x 3 days  - lovenox therapeutic was started on 3/22 and patient received 3 doses without further bleeding and hgb remained stable     Patient is cleared for discharge  Plan was discussed with Dr Dinero.

## 2025-03-23 NOTE — DISCHARGE NOTE PROVIDER - NSDCHC_MEDRECSTATUS_GEN_ALL_CORE
Nurse appointment scheduled for 11/30/22   Admission Reconciliation is Completed  Discharge Reconciliation is Completed

## 2025-03-23 NOTE — DISCHARGE NOTE NURSING/CASE MANAGEMENT/SOCIAL WORK - PATIENT PORTAL LINK FT
You can access the FollowMyHealth Patient Portal offered by [facility] by registering at the following website: [email or web address]. By joining [facility]’s FollowMyHealth portal, you will also be able to view your health information using other applications (apps) compatible with our system.

## 2025-03-28 DIAGNOSIS — K31.89 OTHER DISEASES OF STOMACH AND DUODENUM: ICD-10-CM

## 2025-03-28 DIAGNOSIS — Z91.81 HISTORY OF FALLING: ICD-10-CM

## 2025-03-28 DIAGNOSIS — I47.10 SUPRAVENTRICULAR TACHYCARDIA, UNSPECIFIED: ICD-10-CM

## 2025-03-28 DIAGNOSIS — K76.6 PORTAL HYPERTENSION: ICD-10-CM

## 2025-03-28 DIAGNOSIS — J38.2 NODULES OF VOCAL CORDS: ICD-10-CM

## 2025-03-28 DIAGNOSIS — E78.5 HYPERLIPIDEMIA, UNSPECIFIED: ICD-10-CM

## 2025-03-28 DIAGNOSIS — Z79.01 LONG TERM (CURRENT) USE OF ANTICOAGULANTS: ICD-10-CM

## 2025-03-28 DIAGNOSIS — F17.210 NICOTINE DEPENDENCE, CIGARETTES, UNCOMPLICATED: ICD-10-CM

## 2025-03-28 DIAGNOSIS — E87.6 HYPOKALEMIA: ICD-10-CM

## 2025-03-28 DIAGNOSIS — I48.0 PAROXYSMAL ATRIAL FIBRILLATION: ICD-10-CM

## 2025-03-28 DIAGNOSIS — I10 ESSENTIAL (PRIMARY) HYPERTENSION: ICD-10-CM

## 2025-03-28 DIAGNOSIS — I85.11 SECONDARY ESOPHAGEAL VARICES WITH BLEEDING: ICD-10-CM

## 2025-03-28 DIAGNOSIS — E83.42 HYPOMAGNESEMIA: ICD-10-CM

## 2025-03-28 DIAGNOSIS — Z71.6 TOBACCO ABUSE COUNSELING: ICD-10-CM

## 2025-03-28 DIAGNOSIS — F10.20 ALCOHOL DEPENDENCE, UNCOMPLICATED: ICD-10-CM

## 2025-03-28 DIAGNOSIS — D62 ACUTE POSTHEMORRHAGIC ANEMIA: ICD-10-CM

## 2025-03-28 DIAGNOSIS — K70.30 ALCOHOLIC CIRRHOSIS OF LIVER WITHOUT ASCITES: ICD-10-CM

## 2025-03-28 DIAGNOSIS — Z71.41 ALCOHOL ABUSE COUNSELING AND SURVEILLANCE OF ALCOHOLIC: ICD-10-CM

## 2025-05-15 ENCOUNTER — APPOINTMENT (OUTPATIENT)
Dept: GASTROENTEROLOGY | Facility: CLINIC | Age: 70
End: 2025-05-15

## 2025-06-17 ENCOUNTER — APPOINTMENT (OUTPATIENT)
Dept: GASTROENTEROLOGY | Facility: CLINIC | Age: 70
End: 2025-06-17